# Patient Record
Sex: FEMALE | Race: OTHER | HISPANIC OR LATINO
[De-identification: names, ages, dates, MRNs, and addresses within clinical notes are randomized per-mention and may not be internally consistent; named-entity substitution may affect disease eponyms.]

---

## 2017-02-24 ENCOUNTER — APPOINTMENT (OUTPATIENT)
Dept: ORTHOPEDIC SURGERY | Facility: CLINIC | Age: 42
End: 2017-02-24

## 2018-04-27 ENCOUNTER — LABORATORY RESULT (OUTPATIENT)
Age: 43
End: 2018-04-27

## 2018-04-27 ENCOUNTER — NON-APPOINTMENT (OUTPATIENT)
Age: 43
End: 2018-04-27

## 2018-04-27 ENCOUNTER — APPOINTMENT (OUTPATIENT)
Dept: PEDIATRIC ALLERGY IMMUNOLOGY | Facility: CLINIC | Age: 43
End: 2018-04-27
Payer: MEDICAID

## 2018-04-27 VITALS
OXYGEN SATURATION: 98 % | HEART RATE: 89 BPM | SYSTOLIC BLOOD PRESSURE: 134 MMHG | BODY MASS INDEX: 23.55 KG/M2 | WEIGHT: 128 LBS | HEIGHT: 61.97 IN | DIASTOLIC BLOOD PRESSURE: 96 MMHG

## 2018-04-27 DIAGNOSIS — J45.40 MODERATE PERSISTENT ASTHMA, UNCOMPLICATED: ICD-10-CM

## 2018-04-27 DIAGNOSIS — H10.13 ACUTE ATOPIC CONJUNCTIVITIS, BILATERAL: ICD-10-CM

## 2018-04-27 PROCEDURE — 94060 EVALUATION OF WHEEZING: CPT | Mod: 59

## 2018-04-27 PROCEDURE — 99244 OFF/OP CNSLTJ NEW/EST MOD 40: CPT | Mod: 25

## 2018-04-27 PROCEDURE — 95004 PERQ TESTS W/ALRGNC XTRCS: CPT

## 2018-04-27 RX ORDER — ALBUTEROL SULFATE 90 UG/1
108 (90 BASE) AEROSOL, METERED RESPIRATORY (INHALATION)
Qty: 1 | Refills: 2 | Status: ACTIVE | COMMUNITY
Start: 2018-04-27 | End: 1900-01-01

## 2018-05-15 ENCOUNTER — OTHER (OUTPATIENT)
Age: 43
End: 2018-05-15

## 2018-05-15 ENCOUNTER — MOBILE ON CALL (OUTPATIENT)
Age: 43
End: 2018-05-15

## 2018-05-15 LAB
A ALTERNATA IGE QN: <0.1 KUA/L
A FUMIGATUS IGE QN: <0.1 KUA/L
AMER BEECH IGE QN: 0
BOXELDER IGE QN: <0.1 KUA/L
C HERBARUM IGE QN: <0.1 KUA/L
C LUNATA IGE QN: <0.1 KUA/L
CAT DANDER IGE QN: <0.1 KUA/L
CMN PIGWEED IGE QN: <0.1 KUA/L
COCKLEBUR IGE QN: <0.1 KUA/L
COCKSFOOT IGE QN: <0.1 KUA/L
COMMON RAGWEED IGE QN: <0.1 KUA/L
COTTONWOOD IGE QN: <0.1 KUA/L
D FARINAE IGE QN: <0.1 KUA/L
D PTERONYSS IGE QN: <0.1 KUA/L
DEPRECATED A ALTERNATA IGE RAST QL: 0
DEPRECATED A FUMIGATUS IGE RAST QL: 0
DEPRECATED A PULLULANS IGE RAST QL: 0
DEPRECATED AMER BEECH IGE RAST QL: <0.1 KUA/L
DEPRECATED BOXELDER IGE RAST QL: 0
DEPRECATED C HERBARUM IGE RAST QL: 0
DEPRECATED C LUNATA IGE RAST QL: 0
DEPRECATED CAT DANDER IGE RAST QL: 0
DEPRECATED COCKLEBUR IGE RAST QL: 0
DEPRECATED COCKSFOOT IGE RAST QL: 0
DEPRECATED COMMON PIGWEED IGE RAST QL: 0
DEPRECATED COMMON RAGWEED IGE RAST QL: 0
DEPRECATED COTTONWOOD IGE RAST QL: 0
DEPRECATED D FARINAE IGE RAST QL: 0
DEPRECATED D PTERONYSS IGE RAST QL: 0
DEPRECATED DOG DANDER IGE RAST QL: 0
DEPRECATED ENGL PLANTAIN IGE RAST QL: 0
DEPRECATED F MONILIFORME IGE RAST QL: 0
DEPRECATED GIANT RAGWEED IGE RAST QL: 0
DEPRECATED GOOSE FEATHER IGE RAST QL: 0
DEPRECATED GOOSEFOOT IGE RAST QL: 0
DEPRECATED KENT BLUE GRASS IGE RAST QL: 0
DEPRECATED LONDON PLANE IGE RAST QL: 0
DEPRECATED M RACEMOSUS IGE RAST QL: 0
DEPRECATED MUGWORT IGE RAST QL: 0
DEPRECATED P NOTATUM IGE RAST QL: 0
DEPRECATED RED CEDAR IGE RAST QL: 0
DEPRECATED RED TOP GRASS IGE RAST QL: 0
DEPRECATED ROACH IGE RAST QL: 0
DEPRECATED RYE IGE RAST QL: 0
DEPRECATED SALTWORT IGE RAST QL: 0
DEPRECATED SILVER BIRCH IGE RAST QL: 0
DEPRECATED TIMOTHY IGE RAST QL: 0
DEPRECATED WHITE ASH IGE RAST QL: 0
DEPRECATED WHITE HICKORY IGE RAST QL: 0
DEPRECATED WHITE OAK IGE RAST QL: 0
DOG DANDER IGE QN: <0.1 KUA/L
ENGL PLANTAIN IGE QN: <0.1 KUA/L
F MONILIFORME IGE QN: <0.1 KUA/L
GIANT RAGWEED IGE QN: <0.1 KUA/L
GOOSE FEATHER IGE QN: <0.1 KUA/L
GOOSEFOOT IGE QN: <0.1 KUA/L
GRAY ALDER (T2) CLASS: 0
GRAY ALDER (T2) CONC: <0.1 KUA/L
KENT BLUE GRASS IGE QN: <0.1 KUA/L
LONDON PLANE IGE QN: <0.1 KUA/L
M RACEMOSUS IGE QN: <0.1 KUA/L
MOLD (AUREOBASIDIUM M12) CONC: <0.1 KUA/L
MUGWORT IGE QN: <0.1 KUA/L
MULBERRY (T70) CLASS: 0
MULBERRY (T70) CONC: <0.1 KUA/L
P NOTATUM IGE QN: <0.1 KUA/L
RED CEDAR IGE QN: <0.1 KUA/L
RED TOP GRASS IGE QN: <0.1 KUA/L
ROACH IGE QN: <0.1 KUA/L
RYE IGE QN: <0.1 KUA/L
SALTWORT IGE QN: <0.1 KUA/L
SILVER BIRCH IGE QN: <0.1 KUA/L
TIMOTHY IGE QN: <0.1 KUA/L
WHITE ASH IGE QN: <0.1 KUA/L
WHITE ELM IGE QN: 0
WHITE ELM IGE QN: <0.1 KUA/L
WHITE HICKORY IGE QN: <0.1 KUA/L
WHITE OAK IGE QN: <0.1 KUA/L

## 2018-05-25 ENCOUNTER — NON-APPOINTMENT (OUTPATIENT)
Age: 43
End: 2018-05-25

## 2018-05-25 ENCOUNTER — APPOINTMENT (OUTPATIENT)
Dept: PEDIATRIC ALLERGY IMMUNOLOGY | Facility: CLINIC | Age: 43
End: 2018-05-25
Payer: MEDICAID

## 2018-05-25 VITALS
DIASTOLIC BLOOD PRESSURE: 90 MMHG | BODY MASS INDEX: 24.74 KG/M2 | SYSTOLIC BLOOD PRESSURE: 140 MMHG | WEIGHT: 126 LBS | HEIGHT: 60 IN | OXYGEN SATURATION: 98 % | HEART RATE: 80 BPM

## 2018-05-25 DIAGNOSIS — J45.30 MILD PERSISTENT ASTHMA, UNCOMPLICATED: ICD-10-CM

## 2018-05-25 DIAGNOSIS — J31.0 CHRONIC RHINITIS: ICD-10-CM

## 2018-05-25 PROCEDURE — 94010 BREATHING CAPACITY TEST: CPT

## 2018-05-25 PROCEDURE — 99214 OFFICE O/P EST MOD 30 MIN: CPT | Mod: 25

## 2018-05-25 RX ORDER — AZELASTINE HYDROCHLORIDE 0.5 MG/ML
0.05 SOLUTION/ DROPS OPHTHALMIC TWICE DAILY
Qty: 1 | Refills: 1 | Status: DISCONTINUED | COMMUNITY
Start: 2018-04-27 | End: 2018-05-25

## 2018-05-25 RX ORDER — CHOLECALCIFEROL (VITAMIN D3) 1250 MCG
1.25 MG TABLET ORAL
Qty: 4 | Refills: 0 | Status: ACTIVE | COMMUNITY
Start: 2018-02-14

## 2018-05-25 RX ORDER — AZITHROMYCIN 250 MG/1
250 TABLET, FILM COATED ORAL
Qty: 6 | Refills: 0 | Status: COMPLETED | COMMUNITY
Start: 2018-05-11

## 2018-05-25 RX ORDER — FLUTICASONE PROPIONATE 50 UG/1
50 SPRAY, METERED NASAL DAILY
Qty: 1 | Refills: 2 | Status: DISCONTINUED | COMMUNITY
Start: 2018-04-27 | End: 2018-05-25

## 2018-05-26 ENCOUNTER — EMERGENCY (EMERGENCY)
Facility: HOSPITAL | Age: 43
LOS: 1 days | Discharge: HOME | End: 2018-05-26
Admitting: EMERGENCY MEDICINE
Payer: MEDICAID

## 2018-05-26 VITALS
SYSTOLIC BLOOD PRESSURE: 164 MMHG | HEART RATE: 72 BPM | OXYGEN SATURATION: 100 % | RESPIRATION RATE: 16 BRPM | DIASTOLIC BLOOD PRESSURE: 90 MMHG

## 2018-05-26 VITALS
DIASTOLIC BLOOD PRESSURE: 93 MMHG | RESPIRATION RATE: 16 BRPM | OXYGEN SATURATION: 100 % | TEMPERATURE: 98 F | HEART RATE: 89 BPM | SYSTOLIC BLOOD PRESSURE: 158 MMHG

## 2018-05-26 DIAGNOSIS — I51.89 OTHER ILL-DEFINED HEART DISEASES: Chronic | ICD-10-CM

## 2018-05-26 PROCEDURE — 99284 EMERGENCY DEPT VISIT MOD MDM: CPT

## 2018-05-26 RX ORDER — IBUPROFEN 200 MG
1 TABLET ORAL
Qty: 20 | Refills: 0
Start: 2018-05-26 | End: 2018-05-30

## 2018-05-26 RX ORDER — DIAZEPAM 5 MG
1 TABLET ORAL
Qty: 8 | Refills: 0
Start: 2018-05-26 | End: 2018-05-27

## 2018-05-26 RX ORDER — KETOROLAC TROMETHAMINE 30 MG/ML
15 SYRINGE (ML) INJECTION ONCE
Qty: 0 | Refills: 0 | Status: DISCONTINUED | OUTPATIENT
Start: 2018-05-26 | End: 2018-05-26

## 2018-05-26 RX ORDER — LIDOCAINE 4 G/100G
1 CREAM TOPICAL ONCE
Qty: 0 | Refills: 0 | Status: COMPLETED | OUTPATIENT
Start: 2018-05-26 | End: 2018-05-26

## 2018-05-26 RX ADMIN — LIDOCAINE 1 PATCH: 4 CREAM TOPICAL at 15:31

## 2018-05-26 RX ADMIN — Medication 15 MILLIGRAM(S): at 15:31

## 2018-05-26 NOTE — ED PROVIDER NOTE - MEDICAL DECISION MAKING DETAILS
44 yo F here for lower back pain acute on chronic. no red flags. no midline pain. otherwise well. will give nsaids, lidoderm, valium and dc with short rx. follow up with spine.

## 2018-05-26 NOTE — ED PROVIDER NOTE - PLAN OF CARE
Take medication as prescribed. Do not drive or operate machinery while taking valium. Rest, drink plenty of fluids.  Advance activity as tolerated.  Continue all previously prescribed medications as directed. You can use motrin 600mg every 6-8 hours for pain or fever, and/or Tylenol 650 mg every 4 hours for pain/fever. Follow up with your primary care physician in 48-72 hours- bring copies of your results.  Return to the emergency department for chest pain, shortness of breath, dizziness, or worsening, concerning, or persistent symptoms.

## 2018-05-26 NOTE — ED PROVIDER NOTE - PHYSICAL EXAMINATION
back: no midline tenderness, no palpable step off, no erythema/ecchymosis/swelling, from. mild bilateral lumbar paraspinal msk tenderness.

## 2018-05-26 NOTE — ED PROVIDER NOTE - OBJECTIVE STATEMENT
42 yo F here for acute on chronic back pain x 2 days. pt requesting daughter to translate in Sammarinese at the bedside. pt reports she was lifting laundry and began to feel bilateral lower back pain. no meds taken. denies direct injury or trauma. reports years ago had back surgery and was told she has two discs that "are bad" however they are waiting on a second surgery until she is older. reports this pain happens "a few times per month". not currently on meds/pt. denies urinary/bladder incontinence. denies saddle anesthesia. denies fever chills vomiting diarrhea cp sob weakness numbness tingling

## 2018-05-26 NOTE — ED PROVIDER NOTE - CARE PLAN
Principal Discharge DX:	Back pain  Assessment and plan of treatment:	Take medication as prescribed. Do not drive or operate machinery while taking valium. Rest, drink plenty of fluids.  Advance activity as tolerated.  Continue all previously prescribed medications as directed. You can use motrin 600mg every 6-8 hours for pain or fever, and/or Tylenol 650 mg every 4 hours for pain/fever. Follow up with your primary care physician in 48-72 hours- bring copies of your results.  Return to the emergency department for chest pain, shortness of breath, dizziness, or worsening, concerning, or persistent symptoms.

## 2018-05-26 NOTE — ED PROVIDER NOTE - PMH
Anemia    Asthma    Depression    GERD (gastroesophageal reflux disease)    HTN (hypertension)    Hyperlipidemia  has not been prescribed medication as of yet  Liver disorder  "the Doctor at the clinic told me my liver's inflamed"  Spinal stenosis of lumbar region

## 2018-05-26 NOTE — ED ADULT TRIAGE NOTE - CHIEF COMPLAINT QUOTE
pt brought to triage c/o back pain while "fixing clothes" unable to ambulate 2/2 pain, denies numbness or tingling in extremities, no neuro deficits noted

## 2018-05-30 ENCOUNTER — APPOINTMENT (OUTPATIENT)
Dept: ORTHOPEDIC SURGERY | Facility: CLINIC | Age: 43
End: 2018-05-30
Payer: MEDICAID

## 2018-05-30 DIAGNOSIS — S39.012A STRAIN OF MUSCLE, FASCIA AND TENDON OF LOWER BACK, INITIAL ENCOUNTER: ICD-10-CM

## 2018-05-30 PROCEDURE — 99214 OFFICE O/P EST MOD 30 MIN: CPT

## 2018-05-30 RX ORDER — PREDNISONE 20 MG/1
20 TABLET ORAL DAILY
Qty: 18 | Refills: 0 | Status: ACTIVE | COMMUNITY
Start: 2018-05-30 | End: 1900-01-01

## 2018-06-08 ENCOUNTER — APPOINTMENT (OUTPATIENT)
Dept: ORTHOPEDIC SURGERY | Facility: CLINIC | Age: 43
End: 2018-06-08

## 2018-07-17 ENCOUNTER — OTHER (OUTPATIENT)
Age: 43
End: 2018-07-17

## 2018-07-17 RX ORDER — FEXOFENADINE HYDROCHLORIDE 180 MG/1
180 TABLET ORAL DAILY
Qty: 1 | Refills: 2 | Status: ACTIVE | COMMUNITY
Start: 2018-04-27 | End: 1900-01-01

## 2018-08-31 ENCOUNTER — APPOINTMENT (OUTPATIENT)
Dept: PEDIATRIC ALLERGY IMMUNOLOGY | Facility: CLINIC | Age: 43
End: 2018-08-31

## 2018-09-10 ENCOUNTER — EMERGENCY (EMERGENCY)
Facility: HOSPITAL | Age: 43
LOS: 1 days | Discharge: HOME | End: 2018-09-10
Payer: COMMERCIAL

## 2018-09-10 VITALS
HEIGHT: 57 IN | WEIGHT: 134.92 LBS | RESPIRATION RATE: 18 BRPM | HEART RATE: 96 BPM | DIASTOLIC BLOOD PRESSURE: 86 MMHG | SYSTOLIC BLOOD PRESSURE: 128 MMHG | TEMPERATURE: 98 F | OXYGEN SATURATION: 99 %

## 2018-09-10 VITALS
TEMPERATURE: 98 F | RESPIRATION RATE: 18 BRPM | OXYGEN SATURATION: 100 % | SYSTOLIC BLOOD PRESSURE: 140 MMHG | HEART RATE: 80 BPM | DIASTOLIC BLOOD PRESSURE: 92 MMHG

## 2018-09-10 DIAGNOSIS — R10.2 PELVIC AND PERINEAL PAIN: ICD-10-CM

## 2018-09-10 DIAGNOSIS — I51.89 OTHER ILL-DEFINED HEART DISEASES: Chronic | ICD-10-CM

## 2018-09-10 LAB
ALBUMIN SERPL ELPH-MCNC: 4.4 G/DL — SIGNIFICANT CHANGE UP (ref 3.3–5)
ALP SERPL-CCNC: 154 U/L — HIGH (ref 40–120)
ALT FLD-CCNC: 59 U/L — HIGH (ref 10–45)
ANION GAP SERPL CALC-SCNC: 11 MMOL/L — SIGNIFICANT CHANGE UP (ref 5–17)
APPEARANCE UR: CLEAR — SIGNIFICANT CHANGE UP
APTT BLD: 27.2 SEC — LOW (ref 27.5–37.4)
AST SERPL-CCNC: 35 U/L — SIGNIFICANT CHANGE UP (ref 10–40)
BACTERIA # UR AUTO: NEGATIVE — SIGNIFICANT CHANGE UP
BASOPHILS # BLD AUTO: 0 K/UL — SIGNIFICANT CHANGE UP (ref 0–0.2)
BASOPHILS NFR BLD AUTO: 0.2 % — SIGNIFICANT CHANGE UP (ref 0–2)
BILIRUB SERPL-MCNC: 0.4 MG/DL — SIGNIFICANT CHANGE UP (ref 0.2–1.2)
BILIRUB UR-MCNC: NEGATIVE — SIGNIFICANT CHANGE UP
BLD GP AB SCN SERPL QL: NEGATIVE — SIGNIFICANT CHANGE UP
BUN SERPL-MCNC: 14 MG/DL — SIGNIFICANT CHANGE UP (ref 7–23)
CALCIUM SERPL-MCNC: 9.1 MG/DL — SIGNIFICANT CHANGE UP (ref 8.4–10.5)
CHLORIDE SERPL-SCNC: 97 MMOL/L — SIGNIFICANT CHANGE UP (ref 96–108)
CO2 SERPL-SCNC: 23 MMOL/L — SIGNIFICANT CHANGE UP (ref 22–31)
COLOR SPEC: SIGNIFICANT CHANGE UP
CREAT SERPL-MCNC: 0.75 MG/DL — SIGNIFICANT CHANGE UP (ref 0.5–1.3)
DIFF PNL FLD: NEGATIVE — SIGNIFICANT CHANGE UP
EOSINOPHIL # BLD AUTO: 0.1 K/UL — SIGNIFICANT CHANGE UP (ref 0–0.5)
EOSINOPHIL NFR BLD AUTO: 1 % — SIGNIFICANT CHANGE UP (ref 0–6)
EPI CELLS # UR: 6 /HPF — HIGH
GLUCOSE SERPL-MCNC: 91 MG/DL — SIGNIFICANT CHANGE UP (ref 70–99)
GLUCOSE UR QL: ABNORMAL
HCG SERPL-ACNC: <2 MIU/ML — SIGNIFICANT CHANGE UP
HCT VFR BLD CALC: 34.7 % — SIGNIFICANT CHANGE UP (ref 34.5–45)
HCT VFR BLD CALC: 38.2 % — SIGNIFICANT CHANGE UP (ref 34.5–45)
HGB BLD-MCNC: 11.9 G/DL — SIGNIFICANT CHANGE UP (ref 11.5–15.5)
HGB BLD-MCNC: 13.1 G/DL — SIGNIFICANT CHANGE UP (ref 11.5–15.5)
HYALINE CASTS # UR AUTO: 2 /LPF — SIGNIFICANT CHANGE UP (ref 0–2)
INR BLD: 1.09 RATIO — SIGNIFICANT CHANGE UP (ref 0.88–1.16)
KETONES UR-MCNC: NEGATIVE — SIGNIFICANT CHANGE UP
LEUKOCYTE ESTERASE UR-ACNC: NEGATIVE — SIGNIFICANT CHANGE UP
LYMPHOCYTES # BLD AUTO: 1.4 K/UL — SIGNIFICANT CHANGE UP (ref 1–3.3)
LYMPHOCYTES # BLD AUTO: 13.3 % — SIGNIFICANT CHANGE UP (ref 13–44)
MCHC RBC-ENTMCNC: 28.9 PG — SIGNIFICANT CHANGE UP (ref 27–34)
MCHC RBC-ENTMCNC: 29 PG — SIGNIFICANT CHANGE UP (ref 27–34)
MCHC RBC-ENTMCNC: 34.3 GM/DL — SIGNIFICANT CHANGE UP (ref 32–36)
MCHC RBC-ENTMCNC: 34.3 GM/DL — SIGNIFICANT CHANGE UP (ref 32–36)
MCV RBC AUTO: 84.4 FL — SIGNIFICANT CHANGE UP (ref 80–100)
MCV RBC AUTO: 84.5 FL — SIGNIFICANT CHANGE UP (ref 80–100)
MONOCYTES # BLD AUTO: 0.7 K/UL — SIGNIFICANT CHANGE UP (ref 0–0.9)
MONOCYTES NFR BLD AUTO: 7 % — SIGNIFICANT CHANGE UP (ref 2–14)
NEUTROPHILS # BLD AUTO: 8.1 K/UL — HIGH (ref 1.8–7.4)
NEUTROPHILS NFR BLD AUTO: 78.5 % — HIGH (ref 43–77)
NITRITE UR-MCNC: NEGATIVE — SIGNIFICANT CHANGE UP
PH UR: 7 — SIGNIFICANT CHANGE UP (ref 5–8)
PLATELET # BLD AUTO: 228 K/UL — SIGNIFICANT CHANGE UP (ref 150–400)
PLATELET # BLD AUTO: 254 K/UL — SIGNIFICANT CHANGE UP (ref 150–400)
POTASSIUM SERPL-MCNC: 3.2 MMOL/L — LOW (ref 3.5–5.3)
POTASSIUM SERPL-SCNC: 3.2 MMOL/L — LOW (ref 3.5–5.3)
PROT SERPL-MCNC: 7.5 G/DL — SIGNIFICANT CHANGE UP (ref 6–8.3)
PROT UR-MCNC: ABNORMAL
PROTHROM AB SERPL-ACNC: 11.9 SEC — SIGNIFICANT CHANGE UP (ref 9.8–12.7)
RBC # BLD: 4.1 M/UL — SIGNIFICANT CHANGE UP (ref 3.8–5.2)
RBC # BLD: 4.53 M/UL — SIGNIFICANT CHANGE UP (ref 3.8–5.2)
RBC # FLD: 12.9 % — SIGNIFICANT CHANGE UP (ref 10.3–14.5)
RBC # FLD: 13.2 % — SIGNIFICANT CHANGE UP (ref 10.3–14.5)
RBC CASTS # UR COMP ASSIST: 4 /HPF — SIGNIFICANT CHANGE UP (ref 0–4)
RH IG SCN BLD-IMP: POSITIVE — SIGNIFICANT CHANGE UP
RH IG SCN BLD-IMP: POSITIVE — SIGNIFICANT CHANGE UP
SODIUM SERPL-SCNC: 131 MMOL/L — LOW (ref 135–145)
SP GR SPEC: 1.02 — SIGNIFICANT CHANGE UP (ref 1.01–1.02)
UROBILINOGEN FLD QL: NEGATIVE — SIGNIFICANT CHANGE UP
WBC # BLD: 10.3 K/UL — SIGNIFICANT CHANGE UP (ref 3.8–10.5)
WBC # BLD: 7.7 K/UL — SIGNIFICANT CHANGE UP (ref 3.8–10.5)
WBC # FLD AUTO: 10.3 K/UL — SIGNIFICANT CHANGE UP (ref 3.8–10.5)
WBC # FLD AUTO: 7.7 K/UL — SIGNIFICANT CHANGE UP (ref 3.8–10.5)
WBC UR QL: 2 /HPF — SIGNIFICANT CHANGE UP (ref 0–5)

## 2018-09-10 PROCEDURE — 85730 THROMBOPLASTIN TIME PARTIAL: CPT

## 2018-09-10 PROCEDURE — 86901 BLOOD TYPING SEROLOGIC RH(D): CPT

## 2018-09-10 PROCEDURE — 74177 CT ABD & PELVIS W/CONTRAST: CPT

## 2018-09-10 PROCEDURE — 85610 PROTHROMBIN TIME: CPT

## 2018-09-10 PROCEDURE — 99243 OFF/OP CNSLTJ NEW/EST LOW 30: CPT

## 2018-09-10 PROCEDURE — 74177 CT ABD & PELVIS W/CONTRAST: CPT | Mod: 26

## 2018-09-10 PROCEDURE — 85027 COMPLETE CBC AUTOMATED: CPT

## 2018-09-10 PROCEDURE — 81001 URINALYSIS AUTO W/SCOPE: CPT

## 2018-09-10 PROCEDURE — 76817 TRANSVAGINAL US OBSTETRIC: CPT

## 2018-09-10 PROCEDURE — 80053 COMPREHEN METABOLIC PANEL: CPT

## 2018-09-10 PROCEDURE — 86850 RBC ANTIBODY SCREEN: CPT

## 2018-09-10 PROCEDURE — 96374 THER/PROPH/DIAG INJ IV PUSH: CPT | Mod: XU

## 2018-09-10 PROCEDURE — 93975 VASCULAR STUDY: CPT

## 2018-09-10 PROCEDURE — 76817 TRANSVAGINAL US OBSTETRIC: CPT | Mod: 26

## 2018-09-10 PROCEDURE — 84702 CHORIONIC GONADOTROPIN TEST: CPT

## 2018-09-10 PROCEDURE — 93975 VASCULAR STUDY: CPT | Mod: 26

## 2018-09-10 PROCEDURE — 86900 BLOOD TYPING SEROLOGIC ABO: CPT

## 2018-09-10 PROCEDURE — 99284 EMERGENCY DEPT VISIT MOD MDM: CPT

## 2018-09-10 PROCEDURE — 99284 EMERGENCY DEPT VISIT MOD MDM: CPT | Mod: 25

## 2018-09-10 PROCEDURE — 99283 EMERGENCY DEPT VISIT LOW MDM: CPT

## 2018-09-10 RX ORDER — OXYCODONE HYDROCHLORIDE 5 MG/1
5 TABLET ORAL ONCE
Qty: 0 | Refills: 0 | Status: DISCONTINUED | OUTPATIENT
Start: 2018-09-10 | End: 2018-09-10

## 2018-09-10 RX ORDER — OXYCODONE HYDROCHLORIDE 5 MG/1
1 TABLET ORAL
Qty: 3 | Refills: 0
Start: 2018-09-10 | End: 2018-09-10

## 2018-09-10 RX ORDER — ACETAMINOPHEN 500 MG
1000 TABLET ORAL ONCE
Qty: 0 | Refills: 0 | Status: COMPLETED | OUTPATIENT
Start: 2018-09-10 | End: 2018-09-10

## 2018-09-10 RX ADMIN — Medication 400 MILLIGRAM(S): at 15:28

## 2018-09-10 RX ADMIN — OXYCODONE HYDROCHLORIDE 5 MILLIGRAM(S): 5 TABLET ORAL at 15:28

## 2018-09-10 NOTE — ED PROVIDER NOTE - NSCAREINITIATED _GEN_ER
Samir Hugo(Attending) NO chest pain, SOB,  dizziness, syncope, increased lowr extremity edema, fever chills, fatigue abdominal pain, N/V/C/D BRBPR, melena, urinary symptoms.

## 2018-09-10 NOTE — ED ADULT NURSE REASSESSMENT NOTE - NS ED NURSE REASSESS COMMENT FT1
Pt remains a&oX4, resting comfortably in bed in no apparent distress. Family at bedside. VSS. Pt reporting some pain relief at this time. safety maintained

## 2018-09-10 NOTE — ED PROVIDER NOTE - OBJECTIVE STATEMENT
43F  h/o HTN, HLD presents with acute onset LLQ pain since yesterday at 4pm, associated with N/V, no diarrhea. No fevers, chills. LMP was . No vaginal discharge. Feels like her abdomen is bloated.  Onset: 4pm

## 2018-09-10 NOTE — ED ADULT NURSE NOTE - NSIMPLEMENTINTERV_GEN_ALL_ED
Implemented All Universal Safety Interventions:  [place] to call system. Call bell, personal items and telephone within reach. Instruct patient to call for assistance. Room bathroom lighting operational. Non-slip footwear when patient is off stretcher. Physically safe environment: no spills, clutter or unnecessary equipment. Stretcher in lowest position, wheels locked, appropriate side rails in place.

## 2018-09-10 NOTE — CONSULT NOTE ADULT - SUBJECTIVE AND OBJECTIVE BOX
GYN Consult Note  : , declined pacific     43y  LMP  presents with sudden onset left sided abdominal pain that began yesterday while riding in the car, worsening progressively throughout the day. Pt states that pain is 7/10 in severity, non radiating, constant in nature. Notes some nausea yesterday, but denies emesis. Denies CP, SOB, fevers, chills, changes in GI/ fxn, vaginal discharge, itchiness, bleeding.        OB/GYN HISTORY:   NSVDx7  pap 2018 wnl as per pt  on OCPs, irregular periods     GYN= Dr. Crockett (sp) from [address] [place] [address] in [place]     PAST MEDICAL & SURGICAL HISTORY:  Hyperlipidemia: has not been prescribed medication as of yet  Liver disorder: &quot;the Doctor at the clinic told me my liver&#x27;s inflamed&quot;  HTN (hypertension)  Spinal stenosis of lumbar region  Asthma  Anemia  GERD (gastroesophageal reflux disease)  Depression  Familial heart disease      REVIEW OF SYSTEMS    General: denies fevers, chills, tiredness    Skin/Breast: denies breast pain  	  Respiratory and Thorax: denies shortness of breath, denies cough  	  Cardiovascular: denies chest pain	 and denies palpitations    Gastrointestinal: +abdominal pain    Genitourinary: denies dysuria, increased urinary frequency, urgency	    Constitutional, Cardiovascular, Respiratory, Gastrointestinal, Genitourinary, Musculoskeletal and Integumentary review of systems are normal except as noted. 	    MEDICATIONS  (STANDING): antihypertensives (unknown name)      Allergies    No Known Allergies    Intolerances        SOCIAL HISTORY: denies x3    FAMILY HISTORY:  Family history of heart disease      Vital Signs Last 24 Hrs  T(C): 36.8 (10 Sep 2018 13:14), Max: 36.8 (10 Sep 2018 13:14)  T(F): 98.3 (10 Sep 2018 13:14), Max: 98.3 (10 Sep 2018 13:14)  HR: 89 (10 Sep 2018 13:14) (84 - 96)  BP: 131/84 (10 Sep 2018 13:14) (128/86 - 146/94)  BP(mean): --  RR: 18 (10 Sep 2018 13:14) (18 - 18)  SpO2: 100% (10 Sep 2018 13:14) (99% - 100%)    PHYSICAL EXAM:      Gen: NAD, alert and oriented x 3    Abd: soft, +mild distension, no rebound/guarding, +LLQ tenderness    Pelvic:  closed/ long, no CMT, Uterus: normal size, non tender    Adnexa: mild left sided tenderness  Extremities: NTBL    Skin: warm and well perfused    LABS:             bHCG <2             11.9   7.7   )-----------( 228      ( 10 Sep 2018 17:07 )             34.7     09-10    131<L>  |  97  |  14  ----------------------------<  91  3.2<L>   |  23  |  0.75    Ca    9.1      10 Sep 2018 09:31    TPro  7.5  /  Alb  4.4  /  TBili  0.4  /  DBili  x   /  AST  35  /  ALT  59<H>  /  AlkPhos  154<H>  09-10    PT/INR - ( 10 Sep 2018 09:31 )   PT: 11.9 sec;   INR: 1.09 ratio         PTT - ( 10 Sep 2018 09:31 )  PTT:27.2 sec  Urinalysis Basic - ( 10 Sep 2018 09:31 )    Color: Light Yellow / Appearance: Clear / S.022 / pH: x  Gluc: x / Ketone: Negative  / Bili: Negative / Urobili: Negative   Blood: x / Protein: 30 mg/dL / Nitrite: Negative   Leuk Esterase: Negative / RBC: 4 /hpf / WBC 2 /hpf   Sq Epi: x / Non Sq Epi: 6 /hpf / Bacteria: Negative        RADIOLOGY & ADDITIONAL STUDIES:    < from: US Doppler Pelvis (09.10.18 @ 11:55) >    < from: CT Abdomen and Pelvis w/ IV Cont (09.10.18 @ 12:58) >    EXAM:  CT ABDOMEN AND PELVIS IC                            PROCEDURE DATE:  09/10/2018            INTERPRETATION:  CLINICAL INFORMATION: Left lower quadrant pain.    COMPARISON: None.    PROCEDURE:   CT of the Abdomen and Pelvis was performed with intravenous contrast.   Intravenous contrast: 90 mL Omnipaque 350. 10 mL discarded.  Oral contrast: positive contrast was administered.  Sagittal and coronal reformats were performed.    FINDINGS:    LOWER CHEST: Within normal limits.    LIVER: Withinnormal limits.  BILE DUCTS: Normal caliber.   GALLBLADDER: Within normal limits.  SPLEEN: Within normal limits.  PANCREAS: Within normal limits.  ADRENALS: Within normal limits.  KIDNEYS/URETERS: Within normal limits.     BLADDER: Within normal limits.  REPRODUCTIVE ORGANS: Left ovary appears enlarged with complex lesion in   the left adnexa. Heterogeneous appearance of the uterus, possibly   adenomyosis or uterine leiomyoma.    BOWEL: Periampullary duodenal diverticulum. No bowel obstruction.   Appendix not visualized, but no secondary signs of appendicitis.    PERITONEUM: Moderate volume hemoperitoneum in the abdomen and pelvis.  VESSELS:  Within normal limits.  RETROPERITONEUM: No lymphadenopathy.    ABDOMINAL WALL: Within normal limits.  BONES: Within normal limits.    IMPRESSION: Moderate volume hemoperitoneum and enlarged left ovary,   consistent with ruptured hemorrhagic ovarian cyst, better seen on pelvic   ultrasound of same date.                    WILLIAM ZARAGOZA M.D., ATTENDING RADIOLOGIST  This document has been electronically signed. Sep 10 2018  1:05PM                < end of copied text >    EXAM:  US OB TRANSVAGINAL                          EXAM:  US DPLX PELVIC                            PROCEDURE DATE:  09/10/2018            INTERPRETATION:  CLINICAL INFORMATION: Left lower quadrant pain. Evaluate   for torsion versus ectopic pregnancy. Beta hCG negative.    LMP: 2018    COMPARISON: None available.    TECHNIQUE:     Endovaginal pelvic sonogram only. Color and Spectral Doppler was   performed.        FINDINGS:    Uterus: 12.3 x 5.8 x 6.2 cm. Within normal limits.    Endometrium: 8 mm. Within normal limits.    Right ovary: 2.9 x 1.9 x 1.9 cm. Within normal limits.    Left ovary: 4.0 x 4.2 x 3.9 cm. Complex cyst, possibly hemorrhagic,   measuring 2.7 x 2.8 x 2.8 cm.    Fluid: Moderate to large amount of hemoperitoneum.    IMPRESSION:    Moderate to large hemoperitoneum likely from ruptured left ovarian   hemorrhagic cyst.    Given left ovarian enlargement; torsion should be excluded on clinical   grounds.                JAYDEN HARP M.D., RADIOLOGY FELLOW  This document has been electronically signed.  OLIVE RENTERIA M.D., ATTENDING RADIOLOGIST  This document has been electronically signed. Sep 10 2018 10:52AM                < end of copied text >

## 2018-09-10 NOTE — ED ADULT NURSE REASSESSMENT NOTE - NS ED NURSE REASSESS COMMENT FT1
Pt remains a&oX4, resting comfortably in bed in no apparent distress. Pt medicated for 7/10 abdominal pain. Family at bedside. safety maintained

## 2018-09-10 NOTE — CONSULT NOTE ADULT - ATTENDING COMMENTS
Pt seen in ER.  Agree w above.  VSS;  Nonacute abd exam;  no clinical evidence of torsion  Hct stable  D/C home;  Pt will f/u w her GYN for repeat sono

## 2018-09-10 NOTE — ED PROVIDER NOTE - PROGRESS NOTE DETAILS
Attending MD Au: Gyn consulted, spoke with Gyn, resident saw patient, requesting repeat CBC and coming down to eval with attending. Attending MD Au: Spoke with Gyn MD Mcneal, CBC reviewed, stable, stable for discharge. Recommend close follow up with Gyn (patient has own but can follow up with clinic if unable to see Gyn), will discharge with anemia precautions. Attending MD Au: Patient re-evaluated and feeling improved.  No acute issues at  this time.  Lab and radiology tests reviewed with patient and family in Uzbek.  Patient stable for discharge.  Follow up instructions given to follow up with her own Gyn (if unable [facility]), anemia precautions reviewed, importance of follow up emphasized, return to ED parameters reviewed and patient verbalized understanding.  All questions answered, all concerns addressed.

## 2018-09-10 NOTE — ED ADULT NURSE NOTE - CHPI ED NUR SYMPTOMS NEG
no diarrhea/no burning urination/no dysuria/no blood in stool/no chills/no vomiting/no fever/no hematuria

## 2018-09-10 NOTE — ED PROVIDER NOTE - PHYSICAL EXAMINATION
Leisa Zelaya, .:   GENERAL: Patient awake alert NAD.  HEENT: Moist mucous membranes, PERRL, EOMI  LUNGS: CTAB, no wheezes or crackles.   CARDIAC: RRR, no m/r/g.    ABDOMEN: Soft, NT, ND, No rebound, guarding. No CVA tenderness.   EXT: No edema. No calf tenderness. CV 2+DP/PT bilaterally.   NEURO: A&Ox3. Gait normal.    SKIN: Warm and dry. No rash.  GYN: External exam wnl. No adnexal tenderness, no masses palpated.

## 2018-09-10 NOTE — ED ADULT NURSE REASSESSMENT NOTE - NS ED NURSE REASSESS COMMENT FT1
Tech from CT called to state that patient never received oral contrast earlier (prior to receiving report from Velvet Castorena); MD Hugo aware and called CT to tell them to do scan without oral contrast.

## 2018-09-10 NOTE — ED PROVIDER NOTE - NS ED ROS FT
CONST: no fevers, no chills  EYES: no pain, no vision changes  ENT: no sore throat, no ear pain, no change in hearing  CV: no chest pain, no leg swelling  RESP: no shortness of breath, no cough  ABD: +abdominal pain, +nausea, +vomiting, no diarrhea  : no dysuria, no flank pain, no hematuria  MSK: no back pain, no extremity pain  NEURO: no headache or additional neurologic complaints  HEME: no easy bleeding  SKIN:  no rash

## 2018-09-10 NOTE — ED ADULT NURSE NOTE - OBJECTIVE STATEMENT
Pt is a 42 yo F German speaking, who came to the ED amb c/o abd pain, abd distention, n/v since 4pm yesterday.  A/O x3, abd soft, distended, tender, normal bowel/urinary habits.

## 2018-09-10 NOTE — ED PROVIDER NOTE - ATTENDING CONTRIBUTION TO CARE
MD Hugo:  patient seen and evaluated with the resident.  I was present for key portions of the History & Physical, and I agree with the Impression & Plan.  MD Hugo:  43F, c/o sudden-onset LLQ pain

## 2018-09-10 NOTE — ED PROVIDER NOTE - MEDICAL DECISION MAKING DETAILS
43F p/w LLQ pain. Concern for ectopic vs. torsion vs. diverticulitis. WIll get TVUS, labs, hcg. IF US neg will get CT abd pel. Dispo pending imaging.

## 2018-09-10 NOTE — CONSULT NOTE ADULT - PROBLEM SELECTOR RECOMMENDATION 9
-likely ruptured hemorrhagic cyst given amount of hemoperitoneum. Hgb trended, stable (13.1->11.9). vital signs stable  -likely not ovarian torsion given benign abdomen  -recommend analgesia prn with tylenol and motrin  -recommend close follow up with GYN, Dr. Crockett  -Can give clinic number for follow up [phone]   -recommend return to ED if experiencing signs of anemia, lightheadedness, SOB, CP, nausea/emesis, worsening abdominal pain    Silke, PGY-3  d/w and seen w/ Dr. Salomon

## 2018-09-10 NOTE — ED ADULT NURSE NOTE - PSH
Familial heart disease Scribe Attestation (For Scribes USE Only)... Scribe Attestation (For Scribes USE Only).../Attending Attestation (For Attendings USE Only)...

## 2018-09-12 ENCOUNTER — APPOINTMENT (OUTPATIENT)
Dept: OBGYN | Facility: CLINIC | Age: 43
End: 2018-09-12
Payer: MEDICAID

## 2018-09-12 ENCOUNTER — LABORATORY RESULT (OUTPATIENT)
Age: 43
End: 2018-09-12

## 2018-09-12 ENCOUNTER — OUTPATIENT (OUTPATIENT)
Dept: OUTPATIENT SERVICES | Facility: HOSPITAL | Age: 43
LOS: 1 days | End: 2018-09-12
Payer: COMMERCIAL

## 2018-09-12 VITALS — BODY MASS INDEX: 26.37 KG/M2 | WEIGHT: 135 LBS

## 2018-09-12 DIAGNOSIS — N76.0 ACUTE VAGINITIS: ICD-10-CM

## 2018-09-12 DIAGNOSIS — R10.2 PELVIC AND PERINEAL PAIN: ICD-10-CM

## 2018-09-12 DIAGNOSIS — I51.89 OTHER ILL-DEFINED HEART DISEASES: Chronic | ICD-10-CM

## 2018-09-12 DIAGNOSIS — Z00.00 ENCOUNTER FOR GENERAL ADULT MEDICAL EXAMINATION W/OUT ABNORMAL FINDINGS: ICD-10-CM

## 2018-09-12 PROCEDURE — G0463: CPT

## 2018-09-12 PROCEDURE — 87591 N.GONORRHOEAE DNA AMP PROB: CPT

## 2018-09-12 PROCEDURE — 87491 CHLMYD TRACH DNA AMP PROBE: CPT

## 2018-09-12 PROCEDURE — 99214 OFFICE O/P EST MOD 30 MIN: CPT | Mod: NC

## 2018-09-12 PROCEDURE — 87624 HPV HI-RISK TYP POOLED RSLT: CPT

## 2018-09-13 LAB
C TRACH RRNA SPEC QL NAA+PROBE: SIGNIFICANT CHANGE UP
HPV HIGH+LOW RISK DNA PNL CVX: SIGNIFICANT CHANGE UP
N GONORRHOEA RRNA SPEC QL NAA+PROBE: SIGNIFICANT CHANGE UP
SPECIMEN SOURCE: SIGNIFICANT CHANGE UP

## 2018-09-15 LAB — CYTOLOGY SPEC DOC CYTO: SIGNIFICANT CHANGE UP

## 2018-09-19 ENCOUNTER — MESSAGE (OUTPATIENT)
Age: 43
End: 2018-09-19

## 2018-09-19 DIAGNOSIS — R10.2 PELVIC AND PERINEAL PAIN: ICD-10-CM

## 2018-09-19 DIAGNOSIS — N83.209 UNSPECIFIED OVARIAN CYST, UNSPECIFIED SIDE: ICD-10-CM

## 2018-09-20 ENCOUNTER — FORM ENCOUNTER (OUTPATIENT)
Age: 43
End: 2018-09-20

## 2018-09-21 ENCOUNTER — OUTPATIENT (OUTPATIENT)
Dept: OUTPATIENT SERVICES | Facility: HOSPITAL | Age: 43
LOS: 1 days | End: 2018-09-21
Payer: COMMERCIAL

## 2018-09-21 ENCOUNTER — APPOINTMENT (OUTPATIENT)
Dept: ULTRASOUND IMAGING | Facility: CLINIC | Age: 43
End: 2018-09-21
Payer: MEDICAID

## 2018-09-21 DIAGNOSIS — Z00.8 ENCOUNTER FOR OTHER GENERAL EXAMINATION: ICD-10-CM

## 2018-09-21 DIAGNOSIS — I51.89 OTHER ILL-DEFINED HEART DISEASES: Chronic | ICD-10-CM

## 2018-09-21 PROCEDURE — 76856 US EXAM PELVIC COMPLETE: CPT

## 2018-09-21 PROCEDURE — 76830 TRANSVAGINAL US NON-OB: CPT | Mod: 26

## 2018-09-21 PROCEDURE — 76856 US EXAM PELVIC COMPLETE: CPT | Mod: 26

## 2018-09-21 PROCEDURE — 76830 TRANSVAGINAL US NON-OB: CPT

## 2018-10-04 ENCOUNTER — OUTPATIENT (OUTPATIENT)
Dept: OUTPATIENT SERVICES | Facility: HOSPITAL | Age: 43
LOS: 1 days | End: 2018-10-04
Payer: COMMERCIAL

## 2018-10-04 ENCOUNTER — APPOINTMENT (OUTPATIENT)
Dept: OBGYN | Facility: CLINIC | Age: 43
End: 2018-10-04
Payer: MEDICAID

## 2018-10-04 VITALS — DIASTOLIC BLOOD PRESSURE: 82 MMHG | WEIGHT: 134 LBS | SYSTOLIC BLOOD PRESSURE: 120 MMHG | BODY MASS INDEX: 26.17 KG/M2

## 2018-10-04 DIAGNOSIS — N76.0 ACUTE VAGINITIS: ICD-10-CM

## 2018-10-04 DIAGNOSIS — I51.89 OTHER ILL-DEFINED HEART DISEASES: Chronic | ICD-10-CM

## 2018-10-04 DIAGNOSIS — N83.209 UNSPECIFIED OVARIAN CYST, UNSPECIFIED SIDE: ICD-10-CM

## 2018-10-04 PROCEDURE — G0463: CPT

## 2018-10-04 PROCEDURE — 99213 OFFICE O/P EST LOW 20 MIN: CPT | Mod: NC

## 2018-10-16 DIAGNOSIS — N83.209 UNSPECIFIED OVARIAN CYST, UNSPECIFIED SIDE: ICD-10-CM

## 2018-11-19 ENCOUNTER — RX RENEWAL (OUTPATIENT)
Age: 43
End: 2018-11-19

## 2018-12-28 ENCOUNTER — OTHER (OUTPATIENT)
Age: 43
End: 2018-12-28

## 2018-12-28 RX ORDER — FLUTICASONE PROPIONATE 110 UG/1
110 AEROSOL, METERED RESPIRATORY (INHALATION)
Qty: 12 | Refills: 0 | Status: ACTIVE | COMMUNITY
Start: 2018-04-27 | End: 1900-01-01

## 2019-12-25 PROBLEM — R10.2 PELVIC PAIN IN FEMALE: Status: ACTIVE | Noted: 2018-09-12

## 2022-03-17 ENCOUNTER — LABORATORY RESULT (OUTPATIENT)
Age: 47
End: 2022-03-17

## 2022-03-17 ENCOUNTER — APPOINTMENT (OUTPATIENT)
Dept: DERMATOLOGY | Facility: CLINIC | Age: 47
End: 2022-03-17
Payer: MEDICAID

## 2022-03-17 VITALS — HEIGHT: 57 IN | BODY MASS INDEX: 28.05 KG/M2 | WEIGHT: 130 LBS

## 2022-03-17 DIAGNOSIS — R21 RASH AND OTHER NONSPECIFIC SKIN ERUPTION: ICD-10-CM

## 2022-03-17 DIAGNOSIS — L73.9 FOLLICULAR DISORDER, UNSPECIFIED: ICD-10-CM

## 2022-03-17 PROCEDURE — 99204 OFFICE O/P NEW MOD 45 MIN: CPT | Mod: 25

## 2022-03-17 PROCEDURE — 99072 ADDL SUPL MATRL&STAF TM PHE: CPT

## 2022-03-17 PROCEDURE — 11102 TANGNTL BX SKIN SINGLE LES: CPT

## 2022-03-17 RX ORDER — BENZOYL PEROXIDE 100 MG/ML
10 LIQUID TOPICAL DAILY
Qty: 1 | Refills: 3 | Status: ACTIVE | COMMUNITY
Start: 2022-03-17 | End: 1900-01-01

## 2022-03-17 RX ORDER — TACROLIMUS 1 MG/G
0.1 OINTMENT TOPICAL
Qty: 60 | Refills: 0 | Status: ACTIVE | COMMUNITY
Start: 2022-03-17 | End: 1900-01-01

## 2022-03-18 ENCOUNTER — NON-APPOINTMENT (OUTPATIENT)
Age: 47
End: 2022-03-18

## 2022-03-24 ENCOUNTER — NON-APPOINTMENT (OUTPATIENT)
Age: 47
End: 2022-03-24

## 2022-03-24 LAB
ALBUMIN MFR SERPL ELPH: 60.7 %
ALBUMIN SERPL-MCNC: 4.4 G/DL
ALBUMIN/GLOB SERPL: 1.6 RATIO
ALBUPE: 41.7 %
ALPHA1 GLOB MFR SERPL ELPH: 3.8 %
ALPHA1 GLOB SERPL ELPH-MCNC: 0.3 G/DL
ALPHA1UPE: 16.8 %
ALPHA2 GLOB MFR SERPL ELPH: 9.2 %
ALPHA2 GLOB SERPL ELPH-MCNC: 0.7 G/DL
ALPHA2UPE: 12.8 %
ANA SER IF-ACNC: NEGATIVE
B-GLOBULIN MFR SERPL ELPH: 11.2 %
B-GLOBULIN SERPL ELPH-MCNC: 0.8 G/DL
BETAUPE: 17.8 %
CREAT 24H UR-MCNC: NORMAL G/24 H
CREATININE UR (MAYO): 49 MG/DL
DEPRECATED KAPPA LC FREE/LAMBDA SER: 0.9 RATIO
DSDNA AB SER-ACNC: <12 IU/ML
ENA JO1 AB SER IA-ACNC: <0.2 AL
ENA RNP AB SER IA-ACNC: 0.2 AL
ENA SM AB SER IA-ACNC: <0.2 AL
ENA SS-A AB SER IA-ACNC: <0.2 AL
ENA SS-B AB SER IA-ACNC: <0.2 AL
GAMMA GLOB FLD ELPH-MCNC: 1.1 G/DL
GAMMA GLOB MFR SERPL ELPH: 15.1 %
GAMMAUPE: 10.9 %
IGA 24H UR QL IFE: NORMAL
IGA SER QL IEP: 186 MG/DL
IGG SER QL IEP: 976 MG/DL
IGM SER QL IEP: 257 MG/DL
INTERPRETATION SERPL IEP-IMP: NORMAL
KAPPA LC 24H UR QL: NORMAL
KAPPA LC CSF-MCNC: 2.51 MG/DL
KAPPA LC SERPL-MCNC: 2.25 MG/DL
M PROTEIN SPEC IFE-MCNC: NORMAL
PROT PATTERN 24H UR ELPH-IMP: NORMAL
PROT SERPL-MCNC: 7.2 G/DL
PROT SERPL-MCNC: 7.2 G/DL
PROT UR-MCNC: 22 MG/DL
PROT UR-MCNC: 22 MG/DL
SPECIMEN VOL 24H UR: NORMAL ML

## 2022-03-30 ENCOUNTER — NON-APPOINTMENT (OUTPATIENT)
Age: 47
End: 2022-03-30

## 2022-05-03 ENCOUNTER — APPOINTMENT (OUTPATIENT)
Dept: DERMATOLOGY | Facility: CLINIC | Age: 47
End: 2022-05-03
Payer: MEDICAID

## 2022-05-03 PROCEDURE — 99214 OFFICE O/P EST MOD 30 MIN: CPT | Mod: 25

## 2022-05-03 PROCEDURE — 11900 INJECT SKIN LESIONS </W 7: CPT

## 2022-05-03 RX ORDER — LEVOCETIRIZINE DIHYDROCHLORIDE 5 MG/1
5 TABLET ORAL
Qty: 1 | Refills: 0 | Status: ACTIVE | COMMUNITY
Start: 2022-05-03 | End: 1900-01-01

## 2022-05-03 RX ORDER — TACROLIMUS 1 MG/G
0.1 OINTMENT TOPICAL
Qty: 60 | Refills: 0 | Status: ACTIVE | COMMUNITY
Start: 2022-05-03 | End: 1900-01-01

## 2022-08-03 ENCOUNTER — APPOINTMENT (OUTPATIENT)
Dept: DERMATOLOGY | Facility: CLINIC | Age: 47
End: 2022-08-03

## 2022-08-03 DIAGNOSIS — L70.9 ACNE, UNSPECIFIED: ICD-10-CM

## 2022-08-03 PROCEDURE — 99214 OFFICE O/P EST MOD 30 MIN: CPT

## 2022-08-04 RX ORDER — TACROLIMUS 1 MG/G
0.1 OINTMENT TOPICAL
Qty: 1 | Refills: 0 | Status: ACTIVE | COMMUNITY
Start: 2022-08-03

## 2022-08-04 RX ORDER — TRETINOIN 0.25 MG/G
0.03 CREAM TOPICAL
Qty: 1 | Refills: 3 | Status: ACTIVE | COMMUNITY
Start: 2022-08-03

## 2022-09-07 ENCOUNTER — APPOINTMENT (OUTPATIENT)
Dept: DERMATOLOGY | Facility: CLINIC | Age: 47
End: 2022-09-07

## 2022-09-07 PROCEDURE — 99213 OFFICE O/P EST LOW 20 MIN: CPT | Mod: 25

## 2022-09-07 PROCEDURE — 95044 PATCH/APPLICATION TESTS: CPT

## 2022-09-09 ENCOUNTER — APPOINTMENT (OUTPATIENT)
Dept: DERMATOLOGY | Facility: CLINIC | Age: 47
End: 2022-09-09

## 2022-09-09 PROCEDURE — 99212 OFFICE O/P EST SF 10 MIN: CPT

## 2022-09-12 ENCOUNTER — APPOINTMENT (OUTPATIENT)
Dept: DERMATOLOGY | Facility: CLINIC | Age: 47
End: 2022-09-12

## 2022-09-12 DIAGNOSIS — B00.9 HERPESVIRAL INFECTION, UNSPECIFIED: ICD-10-CM

## 2022-09-12 DIAGNOSIS — L30.9 DERMATITIS, UNSPECIFIED: ICD-10-CM

## 2022-09-12 PROCEDURE — 99214 OFFICE O/P EST MOD 30 MIN: CPT

## 2022-09-12 RX ORDER — ACYCLOVIR 50 MG/G
5 OINTMENT TOPICAL 3 TIMES DAILY
Qty: 1 | Refills: 0 | Status: ACTIVE | COMMUNITY
Start: 2022-09-12 | End: 1900-01-01

## 2022-09-12 RX ORDER — DESONIDE 0.5 MG/G
0.05 OINTMENT TOPICAL
Qty: 1 | Refills: 3 | Status: ACTIVE | COMMUNITY
Start: 2022-09-12 | End: 1900-01-01

## 2022-12-30 ENCOUNTER — RESULT REVIEW (OUTPATIENT)
Age: 47
End: 2022-12-30

## 2022-12-30 ENCOUNTER — APPOINTMENT (OUTPATIENT)
Dept: BREAST CENTER | Facility: CLINIC | Age: 47
End: 2022-12-30
Payer: MEDICAID

## 2022-12-30 VITALS
WEIGHT: 127 LBS | TEMPERATURE: 97.3 F | BODY MASS INDEX: 27.4 KG/M2 | DIASTOLIC BLOOD PRESSURE: 96 MMHG | SYSTOLIC BLOOD PRESSURE: 158 MMHG | HEART RATE: 88 BPM | HEIGHT: 57 IN

## 2022-12-30 PROCEDURE — 99203 OFFICE O/P NEW LOW 30 MIN: CPT

## 2022-12-30 NOTE — DATA REVIEWED
[FreeTextEntry1] : 9/12/22 [facility], Bilat sMMG: Het dense. L- 6:00 there is a small group of microcalcs which appear to be linearly oriented. L dMMG recommended. Additional scattered benign calc are noted. R- superior, in the lateral aspect 3cm behind the nipple there is a question of architectural distortion. R dMMG and targeted US rec. BR0\par \par 9/27/22 [facility], bilat dMMG and US: Het dense. R- previously questioned possible area of architectural distortion in the upper R breast effaces on spot compression. **L- cluster of coarse het calcs is seen in the lower central L breast 6:00 4cmfn, these cals are indeterminant. Further eval with stereotactic bx is rec. \par US: R- 10:00 2cmfn is a deeply located 0.9 x 0.4 x 1.0cm cyst. No susp finding seen in R breast or axilla.\par L- 12:00 1cmfn 0.8 x 0.5 x 0.8cm circumscribed solid oval hypoechoic mass. *This is probably benign finding and 6 mos f/u US is rec. 12:00 1cmfn 0.3 x 0.3 x 0.3 cm cyst. 8:00 6cmfn 0.5cm cyst. 9:00 9cmfn 0.6cm cyst.\par Impression: L stereotactic bx is rec 6:00. L 6 mos f/u US for 12:00 0.8cm mass. Right no evidence of malignancy. BR4\par \par 10/20/22 [facility], L stereotactic bx. Path: Proliferative and nonproliferative breast changes without atypia characterized by usual type ductal hyperplasia, sclerosing adenosis, columnar cell change, cysts, apocrine metaplasia, pseudoangiomatous stromal hyperplasia and dense stromal fibrosis. Calcs are associated with benign breast tissue. Results are Benign and Concordant. \par

## 2022-12-30 NOTE — HISTORY OF PRESENT ILLNESS
[FreeTextEntry1] : Liza Ortega is a 47 year old Polish speaking female here today for consultation s/p L breast biopsy.\par She was offered  services but requested that her  Mauricio translate for her. \par Referred by iRchie Thomas DO\par PCP Dr. Keith Leventhal\par Pt denies any breast lesions, discharge or masses.\par \par 9/12/22 [facility], Bilat sMMG: Het dense. L- 6:00 there is a small group of microcalcs which appear to be linearly oriented. L dMMG recommended. Additional scattered benign calc are noted. R- superior, in the lateral aspect 3cm behind the nipple there is a question of architectural distortion. R dMMG and targeted US rec. BR0\par \par 9/27/22 [facility], bilat dMMG and US: Het dense. R- previously questioned possible area of architectural distortion in the upper R breast effaces on spot compression. **L- cluster of coarse het calcs is seen in the lower central L breast 6:00 4cmfn, these cals are indeterminant. Further eval with stereotactic bx is rec. \par US: R- 10:00 2cmfn is a deeply located 0.9 x 0.4 x 1.0cm cyst. No susp finding seen in R breast or axilla.\par L- 12:00 1cmfn 0.8 x 0.5 x 0.8cm circumscribed solid oval hypoechoic mass. *This is probably benign finding and 6 mos f/u US is rec. 12:00 1cmfn 0.3 x 0.3 x 0.3 cm cyst. 8:00 6cmfn 0.5cm cyst. 9:00 9cmfn 0.6cm cyst.\par Impression: L stereotactic bx is rec 6:00. L 6 mos f/u US for 12:00 0.8cm mass. Right no evidence of malignancy. BR4\par \par 10/20/22 [facility], L stereotactic bx. Path: Proliferative and nonproliferative breast changes without atypia characterized by usual type ductal hyperplasia, sclerosing adenosis, columnar cell change, cysts, apocrine metaplasia, pseudoangiomatous stromal hyperplasia and dense stromal fibrosis. Calcs are associated with benign breast tissue. Results are Benign and Concordant. \par \par Fhx: None. Not AJ

## 2022-12-30 NOTE — PHYSICAL EXAM
[Bra Size: ___] : Bra Size: [unfilled] [Normocephalic] : normocephalic [Atraumatic] : atraumatic [Supple] : supple [No Supraclavicular Adenopathy] : no supraclavicular adenopathy [No Thyromegaly] : no thyromegaly [Examined in the supine and seated position] : examined in the supine and seated position [Symmetrical] : symmetrical [No dominant masses] : no dominant masses in right breast  [No dominant masses] : no dominant masses left breast [No Nipple Retraction] : no left nipple retraction [No Nipple Discharge] : no left nipple discharge [No Axillary Lymphadenopathy] : no left axillary lymphadenopathy [No Edema] : no edema [No Swelling] : no swelling [Full ROM] : full range of motion [No Rashes] : no rashes [No Ulceration] : no ulceration

## 2022-12-30 NOTE — PAST MEDICAL HISTORY
[Menarche Age ____] : age at menarche was [unfilled] [Definite ___ (Date)] : the last menstrual period was [unfilled] [Total Preg ___] : G[unfilled] [Live Births ___] : P[unfilled]  [Living ___] : Living: [unfilled] [Age At Live Birth ___] : Age at live birth: [unfilled]

## 2022-12-30 NOTE — REVIEW OF SYSTEMS
[Negative] : Heme/Lymph [Recent Weight Loss (___ Lbs)] : recent [unfilled] ~Ulb weight loss [FreeTextEntry2] : Due to stress and family drama lost 10 lbs in a month

## 2022-12-30 NOTE — CONSULT LETTER
[Dear  ___] : Dear  [unfilled], [Consult Letter:] : I had the pleasure of evaluating your patient, [unfilled]. [Please see my note below.] : Please see my note below. [Consult Closing:] : Thank you very much for allowing me to participate in the care of this patient.  If you have any questions, please do not hesitate to contact me. [Sincerely,] : Sincerely, [FreeTextEntry3] : Sienna De Leon MD

## 2022-12-30 NOTE — ASSESSMENT
[FreeTextEntry1] : Liza Ortega is a 47 year old Occitan speaking female here today for consultation s/p L breast biopsy.\par She was offered  services but requested that her  Mauricio translate for her. \par Referred by Richie Thomas DO\par PCP Dr. Keith Leventhal\par Pt denies any breast lesions, discharge or masses.\par \par 9/12/22 [facility], Bilat sMMG: Het dense. L- 6:00 there is a small group of microcalcs which appear to be linearly oriented. L dMMG recommended. Additional scattered benign calc are noted. R- superior, in the lateral aspect 3cm behind the nipple there is a question of architectural distortion. R dMMG and targeted US rec. BR0\par \par 9/27/22 [facility], bilat dMMG and US: Het dense. R- previously questioned possible area of architectural distortion in the upper R breast effaces on spot compression. **L- cluster of coarse het calcs is seen in the lower central L breast 6:00 4cmfn, these cals are indeterminant. Further eval with stereotactic bx is rec. \par US: R- 10:00 2cmfn is a deeply located 0.9 x 0.4 x 1.0cm cyst. No susp finding seen in R breast or axilla.\par L- 12:00 1cmfn 0.8 x 0.5 x 0.8cm circumscribed solid oval hypoechoic mass. *This is probably benign finding and 6 mos f/u US is rec. 12:00 1cmfn 0.3 x 0.3 x 0.3 cm cyst. 8:00 6cmfn 0.5cm cyst. 9:00 9cmfn 0.6cm cyst.\par Impression: L stereotactic bx is rec 6:00. L 6 mos f/u US for 12:00 0.8cm mass. Right no evidence of malignancy. BR4\par \par 10/20/22 [facility], L stereotactic bx. Path: Proliferative and nonproliferative breast changes without atypia characterized by usual type ductal hyperplasia, sclerosing adenosis, columnar cell change, cysts, apocrine metaplasia, pseudoangiomatous stromal hyperplasia and dense stromal fibrosis. Calcs are associated with benign breast tissue. Results are Benign and Concordant. \par \par Fhx: None for cancer. Not AJ\par CBE: DANA, small scar from core bx on left 4:00. FCC, No axillary or SC lymphadenopathy. \par Reviewed studies and benign results with pt and . REc is for 6 mos f.u left mmg and u/s and then f/u after with us. [place] will go to [place], rec they bring prior study CDs with them.

## 2023-03-28 ENCOUNTER — APPOINTMENT (OUTPATIENT)
Dept: MAMMOGRAPHY | Facility: CLINIC | Age: 48
End: 2023-03-28
Payer: MEDICAID

## 2023-03-28 ENCOUNTER — RESULT REVIEW (OUTPATIENT)
Age: 48
End: 2023-03-28

## 2023-03-28 ENCOUNTER — APPOINTMENT (OUTPATIENT)
Dept: ULTRASOUND IMAGING | Facility: CLINIC | Age: 48
End: 2023-03-28
Payer: MEDICAID

## 2023-03-28 PROCEDURE — 77061 BREAST TOMOSYNTHESIS UNI: CPT | Mod: LT

## 2023-03-28 PROCEDURE — 77065 DX MAMMO INCL CAD UNI: CPT | Mod: RT

## 2023-03-28 PROCEDURE — 76642 ULTRASOUND BREAST LIMITED: CPT | Mod: LT

## 2023-03-28 PROCEDURE — G0279: CPT | Mod: LT

## 2023-03-29 ENCOUNTER — NON-APPOINTMENT (OUTPATIENT)
Age: 48
End: 2023-03-29

## 2023-03-29 ENCOUNTER — RESULT REVIEW (OUTPATIENT)
Age: 48
End: 2023-03-29

## 2023-03-29 DIAGNOSIS — R92.8 OTHER ABNORMAL AND INCONCLUSIVE FINDINGS ON DIAGNOSTIC IMAGING OF BREAST: ICD-10-CM

## 2023-03-30 ENCOUNTER — OUTPATIENT (OUTPATIENT)
Dept: OUTPATIENT SERVICES | Facility: HOSPITAL | Age: 48
LOS: 1 days | End: 2023-03-30
Payer: COMMERCIAL

## 2023-03-30 ENCOUNTER — APPOINTMENT (OUTPATIENT)
Dept: ULTRASOUND IMAGING | Facility: CLINIC | Age: 48
End: 2023-03-30
Payer: MEDICAID

## 2023-03-30 DIAGNOSIS — R10.813 RIGHT LOWER QUADRANT ABDOMINAL TENDERNESS: ICD-10-CM

## 2023-03-30 DIAGNOSIS — I51.89 OTHER ILL-DEFINED HEART DISEASES: Chronic | ICD-10-CM

## 2023-03-30 PROCEDURE — 76856 US EXAM PELVIC COMPLETE: CPT

## 2023-03-30 PROCEDURE — 76700 US EXAM ABDOM COMPLETE: CPT | Mod: 26

## 2023-03-30 PROCEDURE — 76856 US EXAM PELVIC COMPLETE: CPT | Mod: 26

## 2023-03-30 PROCEDURE — 76700 US EXAM ABDOM COMPLETE: CPT

## 2023-05-08 ENCOUNTER — APPOINTMENT (OUTPATIENT)
Dept: BREAST CENTER | Facility: CLINIC | Age: 48
End: 2023-05-08
Payer: MEDICAID

## 2023-05-08 VITALS
TEMPERATURE: 97.3 F | BODY MASS INDEX: 27.4 KG/M2 | HEART RATE: 67 BPM | HEIGHT: 57 IN | WEIGHT: 127 LBS | DIASTOLIC BLOOD PRESSURE: 73 MMHG | SYSTOLIC BLOOD PRESSURE: 143 MMHG

## 2023-05-08 DIAGNOSIS — R92.2 INCONCLUSIVE MAMMOGRAM: ICD-10-CM

## 2023-05-08 DIAGNOSIS — R92.0 MAMMOGRAPHIC MICROCALCIFICATION FOUND ON DIAGNOSTIC IMAGING OF BREAST: ICD-10-CM

## 2023-05-08 PROCEDURE — 99213 OFFICE O/P EST LOW 20 MIN: CPT

## 2023-05-08 NOTE — DATA REVIEWED
[FreeTextEntry1] : 3/28/23 [place],  dMMG and US: Extremely dense. Bx clip within residual calcs in the region of previous benign stereotactic bx in the L lower central breast. No susp mass, microcals or other sign of malignancy is identified. US: Circumscribed hypoechoic mass 12:00 1cmfn measuring 0.8x0.5x0.8cm, stable since 9/2022. This finding is probably benign, continues short interval f/u is recommended. Impression: MMG negative. Stable probably benign L breast mass. Targeted L US at time of annual mmg is rec. BR3

## 2023-05-08 NOTE — CONSULT LETTER
[Dear  ___] : Dear  [unfilled], [Courtesy Letter:] : I had the pleasure of seeing your patient, [unfilled], in my office today. [Please see my note below.] : Please see my note below. [Consult Closing:] : Thank you very much for allowing me to participate in the care of this patient.  If you have any questions, please do not hesitate to contact me. [Sincerely,] : Sincerely, [FreeTextEntry3] : Sienna De Leon MD

## 2023-05-08 NOTE — ASSESSMENT
[FreeTextEntry1] : Referred by Richie Thomas DO\par PCP Dr. Keith Leventhal\par \par Liza Ortega is a 48 year old Gren[place] speaking female here today for f/u after left mmg and u/s and c/o  R sided cyclical breast pain. Patient states right sided breast pain occurs with her menses, she denies taking analgesic for the pain. Drinks 3 cups of coffee a day.\par Pt denies any breast lesions, discharge or masses.\par \par 9/12/22 [facility], Bilat sMMG: Het dense. L- 6:00 there is a small group of microcalcs which appear to be linearly oriented. L dMMG recommended. Additional scattered benign calc are noted. R- superior, in the lateral aspect 3cm behind the nipple there is a question of architectural distortion. R dMMG and targeted US rec. BR0\par 9/27/22 [facility], bilat dMMG and US: Het dense. R- previously questioned possible area of architectural distortion in the upper R breast effaces on spot compression. **L- cluster of coarse het calcs is seen in the lower central L breast 6:00 4cmfn, these cals are indeterminant. Further eval with stereotactic bx is rec. \par US: R- 10:00 2cmfn is a deeply located 0.9 x 0.4 x 1.0cm cyst. No susp finding seen in R breast or axilla.\par L- 12:00 1cmfn 0.8 x 0.5 x 0.8cm circumscribed solid oval hypoechoic mass. *This is probably benign finding and 6 mos f/u US is rec. 12:00 1cmfn 0.3 x 0.3 x 0.3 cm cyst. 8:00 6cmfn 0.5cm cyst. 9:00 9cmfn 0.6cm cyst.\par Impression: L stereotactic bx is rec 6:00. L 6 mos f/u US for 12:00 0.8cm mass. Right no evidence of malignancy. BR4\par 10/20/22 [facility], L stereotactic bx. Path: Proliferative and nonproliferative breast changes without atypia characterized by usual type ductal hyperplasia, sclerosing adenosis, columnar cell change, cysts, apocrine metaplasia, pseudoangiomatous stromal hyperplasia and dense stromal fibrosis. Calcs are associated with benign breast tissue. Results are Benign and Concordant. \par \par 3/28/23 [place], L dMMG and US: Extremely dense. Bx clip within residual calcs in the region of previous benign stereotactic bx in the L lower central breast. No susp mass, microcals or other sign of malignancy is identified. US: Circumscribed hypoechoic mass 12:00 1cmfn measuring 0.8x0.5x0.8cm, stable since 9/2022. This finding is probably benign, continues short interval f/u is recommended. Impression: MMG negative. Stable probably benign L breast mass. Targeted L US at time of annual mmg is rec. BR3 \par \par Fhx: None for cancer. Not AJ\par CBE: FCC R>L, No axillary or SC lymphadenopathy. \par Recommend Ibuprofen as needed for mastalgia, decrease coffee consumption also recommended. Due for sMMG and and US including L targeted 9/23. Patient lives in [place], made her aware we are happy to continue to see her but given her long commute to our office, contact for Dr. Silvia Reyes also provided.

## 2023-05-08 NOTE — HISTORY OF PRESENT ILLNESS
[FreeTextEntry1] : Referred by Richie Thomas DO\par PCP Dr. Keith Leventhal\par \par Liza Ortega is a 48 year old Micronesian speaking female here today for f/u after left mgm and u/s and R sided cyclical breast pain. Patient states right sided breast pain occurs with her menses, she denies taking analgesic for the pain. Drinks 3 cups of coffee a day.\par Pt denies any breast lesions, discharge or masses.\par \par 9/12/22 [facility], Bilat sMMG: Het dense. L- 6:00 there is a small group of microcalcs which appear to be linearly oriented. L dMMG recommended. Additional scattered benign calc are noted. R- superior, in the lateral aspect 3cm behind the nipple there is a question of architectural distortion. R dMMG and targeted US rec. BR0\par 9/27/22 [facility], bilat dMMG and US: Het dense. R- previously questioned possible area of architectural distortion in the upper R breast effaces on spot compression. **L- cluster of coarse het calcs is seen in the lower central L breast 6:00 4cmfn, these cals are indeterminant. Further eval with stereotactic bx is rec. \par US: R- 10:00 2cmfn is a deeply located 0.9 x 0.4 x 1.0cm cyst. No susp finding seen in R breast or axilla.\par L- 12:00 1cmfn 0.8 x 0.5 x 0.8cm circumscribed solid oval hypoechoic mass. *This is probably benign finding and 6 mos f/u US is rec. 12:00 1cmfn 0.3 x 0.3 x 0.3 cm cyst. 8:00 6cmfn 0.5cm cyst. 9:00 9cmfn 0.6cm cyst.\par Impression: L stereotactic bx is rec 6:00. L 6 mos f/u US for 12:00 0.8cm mass. Right no evidence of malignancy. BR4\par 10/20/22 [facility], L stereotactic bx. Path: Proliferative and nonproliferative breast changes without atypia characterized by usual type ductal hyperplasia, sclerosing adenosis, columnar cell change, cysts, apocrine metaplasia, pseudoangiomatous stromal hyperplasia and dense stromal fibrosis. Calcs are associated with benign breast tissue. Results are Benign and Concordant. \par \par 3/28/23 [place],  dMMG and US: Extremely dense. Bx clip within residual calcs in the region of previous benign stereotactic bx in the L lower central breast. No susp mass, microcals or other sign of malignancy is identified. US: Circumscribed hypoechoic mass 12:00 1cmfn measuring 0.8x0.5x0.8cm, stable since 9/2022. This finding is probably benign, continues short interval f/u is recommended. Impression: MMG negative. Stable probably benign L breast mass. Targeted L US at time of annual mmg is rec. BR3 \par \par Fhx: None for cancer. Not AJ\par

## 2023-05-23 ENCOUNTER — OUTPATIENT (OUTPATIENT)
Dept: OUTPATIENT SERVICES | Facility: HOSPITAL | Age: 48
LOS: 1 days | End: 2023-05-23
Payer: MEDICAID

## 2023-05-23 ENCOUNTER — APPOINTMENT (OUTPATIENT)
Dept: CT IMAGING | Facility: CLINIC | Age: 48
End: 2023-05-23
Payer: MEDICAID

## 2023-05-23 DIAGNOSIS — I51.89 OTHER ILL-DEFINED HEART DISEASES: Chronic | ICD-10-CM

## 2023-05-23 DIAGNOSIS — R10.813 RIGHT LOWER QUADRANT ABDOMINAL TENDERNESS: ICD-10-CM

## 2023-05-23 DIAGNOSIS — Z00.00 ENCOUNTER FOR GENERAL ADULT MEDICAL EXAMINATION WITHOUT ABNORMAL FINDINGS: ICD-10-CM

## 2023-05-23 PROCEDURE — 74176 CT ABD & PELVIS W/O CONTRAST: CPT

## 2023-05-23 PROCEDURE — 74176 CT ABD & PELVIS W/O CONTRAST: CPT | Mod: 26

## 2023-12-09 ENCOUNTER — OFFICE (OUTPATIENT)
Dept: URBAN - METROPOLITAN AREA CLINIC 34 | Facility: CLINIC | Age: 48
Setting detail: OPHTHALMOLOGY
End: 2023-12-09
Payer: COMMERCIAL

## 2023-12-09 DIAGNOSIS — H00.022: ICD-10-CM

## 2023-12-09 DIAGNOSIS — H01.004: ICD-10-CM

## 2023-12-09 DIAGNOSIS — H11.153: ICD-10-CM

## 2023-12-09 DIAGNOSIS — H00.025: ICD-10-CM

## 2023-12-09 DIAGNOSIS — H01.001: ICD-10-CM

## 2023-12-09 DIAGNOSIS — H25.13: ICD-10-CM

## 2023-12-09 PROCEDURE — 92004 COMPRE OPH EXAM NEW PT 1/>: CPT | Performed by: OPHTHALMOLOGY

## 2023-12-09 ASSESSMENT — REFRACTION_MANIFEST
OD_VA1: 20/25-1
OS_SPHERE: +1.25
OD_CYLINDER: +0.75
OD_SPHERE: +0.50
OS_CYLINDER: +0.50
OS_VA1: 20/25-2
OS_AXIS: 075
OD_AXIS: 085

## 2023-12-09 ASSESSMENT — LID EXAM ASSESSMENTS
OS_COMMENTS: MISSING GLANDS
OD_MEIBOMITIS: RLL 1+
OD_BLEPHARITIS: RUL T
OS_MEIBOMITIS: LLL 1+
OS_BLEPHARITIS: LUL T

## 2023-12-09 ASSESSMENT — SPHEQUIV_DERIVED
OS_SPHEQUIV: 1.5
OD_SPHEQUIV: 0.875
OS_SPHEQUIV: 1.5
OD_SPHEQUIV: 0.875

## 2023-12-09 ASSESSMENT — REFRACTION_AUTOREFRACTION
OS_CYLINDER: +0.50
OS_SPHERE: +1.25
OD_CYLINDER: +0.75
OD_SPHERE: +0.50
OS_AXIS: 073
OD_AXIS: 086

## 2023-12-09 ASSESSMENT — CONFRONTATIONAL VISUAL FIELD TEST (CVF)
OD_FINDINGS: FULL
OS_FINDINGS: FULL

## 2024-01-05 ENCOUNTER — APPOINTMENT (OUTPATIENT)
Dept: ULTRASOUND IMAGING | Facility: CLINIC | Age: 49
End: 2024-01-05
Payer: MEDICAID

## 2024-01-05 ENCOUNTER — RESULT REVIEW (OUTPATIENT)
Age: 49
End: 2024-01-05

## 2024-01-05 ENCOUNTER — APPOINTMENT (OUTPATIENT)
Dept: MAMMOGRAPHY | Facility: CLINIC | Age: 49
End: 2024-01-05
Payer: MEDICAID

## 2024-01-05 PROCEDURE — 76641 ULTRASOUND BREAST COMPLETE: CPT | Mod: 50

## 2024-01-05 PROCEDURE — 77065 DX MAMMO INCL CAD UNI: CPT | Mod: GG,RT

## 2024-01-17 ENCOUNTER — NON-APPOINTMENT (OUTPATIENT)
Age: 49
End: 2024-01-17

## 2024-01-17 DIAGNOSIS — R92.8 OTHER ABNORMAL AND INCONCLUSIVE FINDINGS ON DIAGNOSTIC IMAGING OF BREAST: ICD-10-CM

## 2024-02-18 ENCOUNTER — OUTPATIENT (OUTPATIENT)
Dept: OUTPATIENT SERVICES | Facility: HOSPITAL | Age: 49
LOS: 1 days | End: 2024-02-18
Payer: MEDICAID

## 2024-02-18 ENCOUNTER — APPOINTMENT (OUTPATIENT)
Dept: CT IMAGING | Facility: IMAGING CENTER | Age: 49
End: 2024-02-18
Payer: MEDICAID

## 2024-02-18 DIAGNOSIS — R19.00 INTRA-ABDOMINAL AND PELVIC SWELLING, MASS AND LUMP, UNSPECIFIED SITE: ICD-10-CM

## 2024-02-18 DIAGNOSIS — I51.89 OTHER ILL-DEFINED HEART DISEASES: Chronic | ICD-10-CM

## 2024-02-18 PROCEDURE — 74177 CT ABD & PELVIS W/CONTRAST: CPT | Mod: 26

## 2024-02-18 PROCEDURE — 74177 CT ABD & PELVIS W/CONTRAST: CPT

## 2024-02-19 ENCOUNTER — APPOINTMENT (OUTPATIENT)
Dept: ORTHOPEDIC SURGERY | Facility: CLINIC | Age: 49
End: 2024-02-19

## 2024-02-27 NOTE — ED PROVIDER NOTE - SHIFT CHANGE DETAILS
START TIME: 9:10a  END TIME:10  TOTAL TIME FACE TO FACE: 50 mins    Subjective   Patient ID: Winston Khan is a 36 y.o. female who presents for   Problem List Items Addressed This Visit       Generalized anxiety disorder - Primary   .    Virtual or Telephone Consent    An interactive audio and video telecommunication system which permits real time communications between the patient (at the originating site) and provider (at the distant site) was utilized to provide this telehealth service.   Verbal consent was requested and obtained from Winston Khan on this date, 02/27/24 for a telehealth visit.      CONTENT OF SESSION: This was a followup appointment (session 10) between provider and patient to address symptoms of postpartum anxiety and OCD. winston is still seeing Dr. Walsh for medication management with positive effect.     Focus of session was on Winston's adjustment to current pregnancy (around 15 wga). Winston reported overall continued decrease in anxiety, though has noticed some preoccupying worry thoughts and difficulty relaxing, as well as excessive guilt. She discussed recent incidents with triggered worry/guilt thoughts. Provider used Socratic questioning to facilitate cognitive restructuring of potential unrealistic or unhelpful cognitions and patient was able to engage in cognitive restructuring. Time was spent on problem solving around strategies to increase relaxation and mindfulness practice. Provider gave psychoeducation on nature and function of anxiety, and role of behavior in anxiety maintenance. Provider gave supportive counseling and normalized common emotional experiences.    Objective   Social History     Substance and Sexual Activity   Alcohol Use Yes    Alcohol/week: 3.0 standard drinks of alcohol    Types: 3 Standard drinks or equivalent per week      Social History     Social History Narrative    Not on file        Assessment/Plan   Problem List Items Addressed This Visit        Generalized anxiety disorder - Primary     PLAN: Plan made for individual CBT to address OCD on monthly basis. She will continue with Dr. Travis Walsh for medication management.      Attending MD Au: 43F with 5cm cyst with mod vol hemoperitoneum, not on AC, attempting pain control, reassess

## 2024-03-07 ENCOUNTER — APPOINTMENT (OUTPATIENT)
Dept: ORTHOPEDIC SURGERY | Facility: CLINIC | Age: 49
End: 2024-03-07
Payer: COMMERCIAL

## 2024-03-07 VITALS — HEIGHT: 57 IN

## 2024-03-07 PROCEDURE — 73130 X-RAY EXAM OF HAND: CPT | Mod: 50

## 2024-03-07 PROCEDURE — 99203 OFFICE O/P NEW LOW 30 MIN: CPT

## 2024-03-07 NOTE — HISTORY OF PRESENT ILLNESS
[de-identified] : 3/7/24: 48yo RHD female () presents for RIGHT wrist pain and BILATERAL middle/ring/small finger numbness. Reports swelling of RIGHT dorsal hand 2 months ago. Numbness is intermittent. Reports that she tried wearing her daughter's wrist brace, but it made the numbness worse.  PMH: HLD. Fibroma. [FreeTextEntry1] : BILATERAL hand  [FreeTextEntry5] : CHRISTIANA ace [RHD] 49 year old female is here today c/o RIGHT > LEFT hand pain and numbness x 3 years w/o injury. c/o numbness (3rd, 4th, 5th digits R>L hand) with prolonged activity. states PCP recommended brace, numbness increased. denies prior injury to hands.

## 2024-03-07 NOTE — IMAGING
[de-identified] : LEFT HAND skin intact. no swelling. no TTP. good elbow extension, flexion. good pronation, supination. wrist ROM: good extension, flexion. good EPL, FPL. good finger extension, flex to full fist. good finger abduction and adduction.  SILT to median, ulnar, radial distributions. palpable radial pulse, brisk cap refill all digits.  5/5, 1st DI 5/5, APB 5/5. no triggering. + Tinel's at carpal tunnel. negative Tinel's at cubital tunnel.   RIGHT HAND skin intact. no swelling. TTP to lunate. good elbow extension, flexion. good pronation, supination. wrist ROM: good extension, flexion. good EPL, FPL. good finger extension, flex to full fist. good finger abduction and adduction.  SILT to median, ulnar, radial distributions. palpable radial pulse, brisk cap refill all digits.  5/5, 1st DI 5-/5, APB 5/5. no triggering. + Tinel's at carpal tunnel. negative Tinel's at cubital tunnel.   XRAYS OF LEFT HAND: no acute displaced fracture or dislocation. mild djd of thumb CMCJ. XRAYS OF RIGHT HAND: no acute displaced fracture or dislocation. cystic changes to ulnar aspect of lunate.

## 2024-03-07 NOTE — ASSESSMENT
[FreeTextEntry1] : The condition was explained to the patient.  Discussed natural history of ulnar abutment, with likely progression of degenerative changes.  Discussed risks and benefits of surgical vs non-surgical treatment - activity modification, brace, NSAID, steroid injection, surgery. - Recommend RIGHT wrist brace, activity modification, OTC pain medication PRN pain.  - EDX to evaluate CTS. - recommend bilateral carpal tunnel night splint. - activity modification PRN.  F/u after EDX.

## 2024-05-01 ENCOUNTER — APPOINTMENT (OUTPATIENT)
Dept: ORTHOPEDIC SURGERY | Facility: CLINIC | Age: 49
End: 2024-05-01
Payer: COMMERCIAL

## 2024-05-01 DIAGNOSIS — G56.02 CARPAL TUNNEL SYNDROME, LEFT UPPER LIMB: ICD-10-CM

## 2024-05-01 PROCEDURE — 99214 OFFICE O/P EST MOD 30 MIN: CPT

## 2024-05-01 NOTE — IMAGING
[de-identified] : LEFT HAND skin intact. no swelling. no TTP. good elbow extension, flexion. good pronation, supination. wrist ROM: good extension, flexion. good EPL, FPL. good finger extension, flex to full fist. good finger abduction and adduction.  SILT to median, ulnar, radial distributions. palpable radial pulse, brisk cap refill all digits.  5/5, 1st DI 5/5, APB 5/5. no triggering. + Tinel's at carpal tunnel. negative Tinel's at cubital tunnel.   RIGHT HAND skin intact. no swelling. TTP to lunate. good elbow extension, flexion. good pronation, supination. wrist ROM: good extension, flexion. good EPL, FPL. good finger extension, flex to full fist. good finger abduction and adduction.  SILT to median, ulnar, radial distributions. palpable radial pulse, brisk cap refill all digits.  5/5, 1st DI 5-/5, APB 5/5. no triggering. + Tinel's at carpal tunnel. negative Tinel's at cubital tunnel.

## 2024-05-01 NOTE — HISTORY OF PRESENT ILLNESS
[de-identified] : 5/1/24: presents with daughter - assisting with HPI and translation ([place]) - f/u EDX for bilateral CTS. - R ulnar abutment.  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 - R median: DML 4.2 ms, DSL 4.1 ms. - L ulnar: 67 m/s above to below elbow, 69 m/s below elbow to wrist. - R ulnar: 67 m/s above to below elbow, 71 m/s below elbow to wrist.  3/7/24: 50yo RHD female () presents for RIGHT wrist pain and BILATERAL middle/ring/small finger numbness. Reports swelling of RIGHT dorsal hand 2 months ago. Numbness is intermittent. Reports that she tried wearing her daughter's wrist brace, but it made the numbness worse.  PMH: GERD. HTN. HLD. Uterine fibroma. PSH: lumbar discectomy. NKDA. [FreeTextEntry1] : BILATERAL hand  [FreeTextEntry5] : CHRISTIANA is here today for BILATERAL hand EMG results. pt states since last visit, pain increased due to cleaning/working more. c/o increased RIGHT > LEFT symptoms.

## 2024-05-01 NOTE — ASSESSMENT
[FreeTextEntry1] : EDX reviewed with patient - bilateral CTS. The condition was explained to the patient.  Discussed risks and benefits of surgical vs non-surgical treatment. Patient would like to proceed with surgery.  Risks of surgery include, but are not limited to bleeding, infection, persistent pain, stiffness, recurrence, need for future surgery, injury to surrounding tendons and neurovascular structures, hypersensitivity, cold sensitivity, CRPS, and loss of function. Goal of surgery is to decompress the nerve and halt progression of neuropathy. Anticipate maximal nerve recovery at 1 year post-op. May have residual nerve deficits (eg pain, numbness, tingling, weakness, atrophy) depending on severity of neuropathy and nerve damage.  Risk of scar sensitivity and pillar pain for several months post-operatively. Surgery also carries a risk of complications related to anesthesia. All patient questions were answered. Patient expressed understanding and would like to proceed with RIGHT carpal tunnel release.  F/u after surgery. Anticipate OOW for 4-6 weeks post-operatively. Plan to address LEFT side after RIGHT side has recovered.  Patient understands that surgery for CTS would not address pain from ulnar abutment.

## 2024-05-02 ENCOUNTER — NON-APPOINTMENT (OUTPATIENT)
Age: 49
End: 2024-05-02

## 2024-05-07 ENCOUNTER — OUTPATIENT (OUTPATIENT)
Dept: OUTPATIENT SERVICES | Facility: HOSPITAL | Age: 49
LOS: 1 days | Discharge: HOME | End: 2024-05-07

## 2024-05-07 DIAGNOSIS — Z01.818 ENCOUNTER FOR OTHER PREPROCEDURAL EXAMINATION: ICD-10-CM

## 2024-05-07 DIAGNOSIS — E78.5 HYPERLIPIDEMIA, UNSPECIFIED: ICD-10-CM

## 2024-05-07 DIAGNOSIS — I10 ESSENTIAL (PRIMARY) HYPERTENSION: ICD-10-CM

## 2024-05-07 DIAGNOSIS — I51.89 OTHER ILL-DEFINED HEART DISEASES: Chronic | ICD-10-CM

## 2024-05-07 DIAGNOSIS — K21.9 GASTRO-ESOPHAGEAL REFLUX DISEASE WITHOUT ESOPHAGITIS: ICD-10-CM

## 2024-05-07 DIAGNOSIS — G56.01 CARPAL TUNNEL SYNDROME, RIGHT UPPER LIMB: ICD-10-CM

## 2024-05-07 DIAGNOSIS — Z98.890 OTHER SPECIFIED POSTPROCEDURAL STATES: Chronic | ICD-10-CM

## 2024-05-07 LAB
ANION GAP SERPL CALC-SCNC: 6 MMOL/L — SIGNIFICANT CHANGE UP (ref 5–17)
BUN SERPL-MCNC: 19 MG/DL — SIGNIFICANT CHANGE UP (ref 7–23)
CALCIUM SERPL-MCNC: 9.3 MG/DL — SIGNIFICANT CHANGE UP (ref 8.5–10.1)
CHLORIDE SERPL-SCNC: 103 MMOL/L — SIGNIFICANT CHANGE UP (ref 96–108)
CO2 SERPL-SCNC: 29 MMOL/L — SIGNIFICANT CHANGE UP (ref 22–31)
CREAT SERPL-MCNC: 0.95 MG/DL — SIGNIFICANT CHANGE UP (ref 0.5–1.3)
EGFR: 73 ML/MIN/1.73M2 — SIGNIFICANT CHANGE UP
GLUCOSE SERPL-MCNC: 132 MG/DL — HIGH (ref 70–99)
HCG SERPL-ACNC: <1 MIU/ML — SIGNIFICANT CHANGE UP
HCT VFR BLD CALC: 37.2 % — SIGNIFICANT CHANGE UP (ref 34.5–45)
HGB BLD-MCNC: 12.8 G/DL — SIGNIFICANT CHANGE UP (ref 11.5–15.5)
MCHC RBC-ENTMCNC: 28.7 PG — SIGNIFICANT CHANGE UP (ref 27–34)
MCHC RBC-ENTMCNC: 34.4 G/DL — SIGNIFICANT CHANGE UP (ref 32–36)
MCV RBC AUTO: 83.4 FL — SIGNIFICANT CHANGE UP (ref 80–100)
NRBC # BLD: 0 /100 WBCS — SIGNIFICANT CHANGE UP (ref 0–0)
PLATELET # BLD AUTO: 266 K/UL — SIGNIFICANT CHANGE UP (ref 150–400)
POTASSIUM SERPL-MCNC: 2.9 MMOL/L — CRITICAL LOW (ref 3.5–5.3)
POTASSIUM SERPL-SCNC: 2.9 MMOL/L — CRITICAL LOW (ref 3.5–5.3)
RBC # BLD: 4.46 M/UL — SIGNIFICANT CHANGE UP (ref 3.8–5.2)
RBC # FLD: 13.7 % — SIGNIFICANT CHANGE UP (ref 10.3–14.5)
SODIUM SERPL-SCNC: 138 MMOL/L — SIGNIFICANT CHANGE UP (ref 135–145)
WBC # BLD: 6.05 K/UL — SIGNIFICANT CHANGE UP (ref 3.8–10.5)
WBC # FLD AUTO: 6.05 K/UL — SIGNIFICANT CHANGE UP (ref 3.8–10.5)

## 2024-05-07 NOTE — H&P PST ADULT - NS SC CAGE ALCOHOL EYE OPENER
Patient is scheduled to follow up with Jaci Gutierrez NP on 9/13 with a repeat head CT to be done prior to the visit. The head CT hasn't been done. I called patient regarding the above, but I received no answer. I LVM letting pt know we are cancelling her appt for tomorrow and asked that she call us to reschedule her appt for after her head CT. no

## 2024-05-07 NOTE — PROVIDER CONTACT NOTE (CRITICAL VALUE NOTIFICATION) - ACTION/TREATMENT ORDERED:
pt aware of potassium results, and denies any chest pain palpitations or sob     pt pcp also made aware of lab results.

## 2024-05-07 NOTE — H&P PST ADULT - NSICDXPASTMEDICALHX_GEN_ALL_CORE_FT
PAST MEDICAL HISTORY:  Anemia     Asthma     Depression     GERD (gastroesophageal reflux disease)     HTN (hypertension)     Hyperlipidemia has not been prescribed medication as of yet    Liver disorder "the Doctor at the clinic told me my liver's inflamed"    Spinal stenosis of lumbar region

## 2024-05-07 NOTE — H&P PST ADULT - NEGATIVE GENERAL GENITOURINARY SYMPTOMS
Pt d/c'd in care of wife, Writer went through AVS including Rx, D/c instructions, and follow up. SALLIE Schaefer removed PIV, no adverse s/sx. Pt wife verbalized understanding, Pt and wife conducted to pharm and main lobby in wheelchair by transport   no hematuria/no dysuria

## 2024-05-07 NOTE — H&P PST ADULT - PROBLEM SELECTOR PLAN 1
Labs-CBC, BMP, , EKG   Medical clearance scheduled prior to PST visit    Preop Hibiclens x 1 day instructions reviewed and given.   Take routine meds DOS with small sips of water, avoid NSAIDs and OTC supplements,   Anesthesiologist to review PST labs, EKG, required clearances, and optimization for surgery

## 2024-05-07 NOTE — H&P PST ADULT - ASSESSMENT
49F HTN, HLD presents to PST for scheduled right carpal tunnel release on 24 with Dr.Wu CAPRINI SCORE    AGE RELATED RISK FACTORS                                                             [ x] Age 41-60 years                                            (1 Point)  [ ] Age: 61-74 years                                           (2 Points)                 [ ] Age= 75 years                                                (3 Points)             DISEASE RELATED RISK FACTORS                                                       [ ] Edema in the lower extremities                 (1 Point)                     [ ] Varicose veins                                               (1 Point)                                 [ ] BMI > 25 Kg/m2                                            (1 Point)                                  [ ] Serious infection (ie PNA)                            (1 Point)                     [ ] Lung disease ( COPD, Emphysema)            (1 Point)                                                                          [ ] Acute myocardial infarction                         (1 Point)                  [ ] Congestive heart failure (in the previous month)  (1 Point)         [ ] Inflammatory bowel disease                            (1 Point)                  [ ] Central venous access, PICC or Port               (2 points)       (within the last month)                                                                [ ] Stroke (in the previous month)                        (5 Points)    [ ] Previous or present malignancy                       (2 points)                                                                                                                                                         HEMATOLOGY RELATED FACTORS                                                         [ ] Prior episodes of VTE                                     (3 Points)                     [ ] Positive family history for VTE                      (3 Points)                  [ ] Prothrombin 23158 A                                     (3 Points)                     [ ] Factor V Leiden                                                (3 Points)                        [ ] Lupus anticoagulants                                      (3 Points)                                                           [ ] Anticardiolipin antibodies                              (3 Points)                                                       [ ] High homocysteine in the blood                   (3 Points)                                             [ ] Other congenital or acquired thrombophilia      (3 Points)                                                [ ] Heparin induced thrombocytopenia                  (3 Points)                                        MOBILITY RELATED FACTORS  [ ] Bed rest                                                         (1 Point)  [ ] Plaster cast                                                    (2 points)  [ ] Bed bound for more than 72 hours           (2 Points)    GENDER SPECIFIC FACTORS  [ ] Pregnancy or had a baby within the last month   (1 Point)  [ ] Post-partum < 6 weeks                                   (1 Point)  [ ] Hormonal therapy  or oral contraception   (1 Point)  [ ] History of pregnancy complications              (1 point)  [ ] Unexplained or recurrent              (1 Point)    OTHER RISK FACTORS                                           (1 Point)  [ ] BMI >40, smoking, diabetes requiring insulin, chemotherapy  blood transfusions and length of surgery over 2 hours    SURGERY RELATED RISK FACTORS  [ ]  Section within the last month     (1 Point)  [ ] Minor surgery                                                  (1 Point)  [ ] Arthroscopic surgery                                       (2 Points)  [ x] Planned major surgery lasting more            (2 Points)      than 45 minutes     [ ] Elective hip or knee joint replacement       (5 points)       surgery                                                TRAUMA RELATED RISK FACTORS  [ ] Fracture of the hip, pelvis, or leg                       (5 Points)  [ ] Spinal cord injury resulting in paralysis             (5 points)       (in the previous month)    [ ] Paralysis  (less than 1 month)                             (5 Points)  [ ] Multiple Trauma within 1 month                        (5 Points)    Total Score [    3    ]    Caprini Score 0-2: Low Risk, NO VTE prophylaxis required for most patients, encourage ambulation  Caprini Score 3-6: Moderate Risk , pharmacologic VTE prophylaxis is indicated for most patients (in the absence of contraindications)  Caprini Score Greater than or =7: High risk, pharmocologic VTE prophylaxis indicated for most patients (in the absence of contraindications)

## 2024-05-17 ENCOUNTER — APPOINTMENT (OUTPATIENT)
Dept: ORTHOPEDIC SURGERY | Facility: HOSPITAL | Age: 49
End: 2024-05-17
Payer: COMMERCIAL

## 2024-05-17 ENCOUNTER — TRANSCRIPTION ENCOUNTER (OUTPATIENT)
Age: 49
End: 2024-05-17

## 2024-05-17 ENCOUNTER — NON-APPOINTMENT (OUTPATIENT)
Age: 49
End: 2024-05-17

## 2024-05-17 ENCOUNTER — OUTPATIENT (OUTPATIENT)
Dept: OUTPATIENT SERVICES | Facility: HOSPITAL | Age: 49
LOS: 1 days | Discharge: HOME | End: 2024-05-17

## 2024-05-17 VITALS — DIASTOLIC BLOOD PRESSURE: 72 MMHG | HEART RATE: 73 BPM | RESPIRATION RATE: 15 BRPM | SYSTOLIC BLOOD PRESSURE: 132 MMHG

## 2024-05-17 VITALS
RESPIRATION RATE: 16 BRPM | HEIGHT: 57 IN | WEIGHT: 121.92 LBS | OXYGEN SATURATION: 99 % | TEMPERATURE: 99 F | DIASTOLIC BLOOD PRESSURE: 88 MMHG | SYSTOLIC BLOOD PRESSURE: 150 MMHG | HEART RATE: 79 BPM

## 2024-05-17 DIAGNOSIS — I51.89 OTHER ILL-DEFINED HEART DISEASES: Chronic | ICD-10-CM

## 2024-05-17 DIAGNOSIS — Z98.890 OTHER SPECIFIED POSTPROCEDURAL STATES: Chronic | ICD-10-CM

## 2024-05-17 LAB
HCG UR QL: NEGATIVE — SIGNIFICANT CHANGE UP
POTASSIUM SERPL-MCNC: 3.3 MMOL/L — LOW (ref 3.5–5.3)
POTASSIUM SERPL-SCNC: 3.3 MMOL/L — LOW (ref 3.5–5.3)

## 2024-05-17 PROCEDURE — 64721 CARPAL TUNNEL SURGERY: CPT | Mod: RT

## 2024-05-17 RX ORDER — AMLODIPINE BESYLATE 2.5 MG/1
1 TABLET ORAL
Refills: 0 | DISCHARGE

## 2024-05-17 RX ORDER — ACETAMINOPHEN 500 MG
1000 TABLET ORAL ONCE
Refills: 0 | Status: DISCONTINUED | OUTPATIENT
Start: 2024-05-17 | End: 2024-05-23

## 2024-05-17 RX ORDER — SODIUM CHLORIDE 9 MG/ML
1000 INJECTION, SOLUTION INTRAVENOUS
Refills: 0 | Status: DISCONTINUED | OUTPATIENT
Start: 2024-05-17 | End: 2024-05-23

## 2024-05-17 RX ORDER — SODIUM CHLORIDE 9 MG/ML
3 INJECTION INTRAMUSCULAR; INTRAVENOUS; SUBCUTANEOUS EVERY 8 HOURS
Refills: 0 | Status: DISCONTINUED | OUTPATIENT
Start: 2024-05-17 | End: 2024-05-17

## 2024-05-17 RX ORDER — FENOFIBRATE,MICRONIZED 130 MG
1 CAPSULE ORAL
Refills: 0 | DISCHARGE

## 2024-05-17 NOTE — ASU PATIENT PROFILE, ADULT - PRO ARRIVE FROM
Subjective   Patient ID: Joyce is a 25 year old       who presents today for prenatal visit.  She is of 23w2d gestation, EDC 9/15/2019    HPI:   yellow discharge, no itch or bleeding or odor  Last pap smear last  in [facility] - normal per patient  Just came back from ultrasound  Needs order for f/u views and growth  Just feeling really tired all the time    Assessment/Plan  Problem List Items Addressed This Visit        Digestive    BMI 40.0-44.9, adult (CMS/[facility])     3#10oz wt gain so far this pregancy which is appropriate  Will cont to monitor            Other    Supervision of normal first pregnancy, antepartum     Order provided for f/u ultraosund  Did not have afp done  1 ht gtt/ cbc to be done next week  Referred to [facility] for P/n classes             Relevant Orders    POCT URINALYSIS DIPSTICK (Completed)    GLUCOSE, GESTATIONAL SCREEN (50G)-140 CUTOFF    CBC NO DIFFERENTIAL    US OB FOLLOW UP SINGLE GESTATION          Return in about 1 month (around 2019) for obstetric check.      
home

## 2024-05-17 NOTE — ASU DISCHARGE PLAN (ADULT/PEDIATRIC) - NS MD DC FALL RISK RISK
For information on Fall & Injury Prevention, visit: [email or web address] OR  [email or web address] OR  https://www.cdc.gov/steadi/patient.html

## 2024-05-22 ENCOUNTER — APPOINTMENT (OUTPATIENT)
Dept: ORTHOPEDIC SURGERY | Facility: CLINIC | Age: 49
End: 2024-05-22
Payer: COMMERCIAL

## 2024-05-22 DIAGNOSIS — M25.831 OTHER SPECIFIED JOINT DISORDERS, RIGHT WRIST: ICD-10-CM

## 2024-05-22 PROCEDURE — 99024 POSTOP FOLLOW-UP VISIT: CPT

## 2024-05-22 NOTE — PHYSICAL EXAM
[de-identified] : RIGHT HAND incision clean, dry, and intact s/p CTR. no erythema or drainage. mild diffuse swelling of hand. bruising of palm. wrist ROM: limited extension, flexion. good EPL, FPL. good finger extension, flex almost to mid-palm. good finger abduction and adduction.  SILT to median, ulnar, radial distributions. palpable radial pulse, brisk cap refill all digits. no triggering.

## 2024-05-22 NOTE — ASSESSMENT
[FreeTextEntry1] : - ok to wash incision with soap and water. do not scrub or soak incision for 2 weeks. do not apply ointment/lotion to incision for 2 weeks. - prescribed OT. demonstrated HEP. - light activity, advance as tolerated. - Work: continue OOW. anticipate 6 weeks post-operatively.   F/u 3 weeks. Plan to address LEFT side after RIGHT side has recovered.

## 2024-05-22 NOTE — HISTORY OF PRESENT ILLNESS
[] : Post Surgical Visit: yes [de-identified] : 5/22/24: HPI obtained with  ([place]) 5 days s/p RIGHT CTR 5/17/24. c/o pain and numbness over dorsal MPJ when making a fist. denies numbness/tingling into fingertips. also c/o pain and numbness of upper arm. denies f/c or ill sx.  5/1/24: presents with daughter - assisting with HPI and translation ([place]) - f/u EDX for bilateral CTS. - R ulnar abutment.  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 - R median: DML 4.2 ms, DSL 4.1 ms. - L ulnar: 67 m/s above to below elbow, 69 m/s below elbow to wrist. - R ulnar: 67 m/s above to below elbow, 71 m/s below elbow to wrist.  3/7/24: 50yo RHD female () presents for RIGHT wrist pain and BILATERAL middle/ring/small finger numbness. Reports swelling of RIGHT dorsal hand 2 months ago. Numbness is intermittent. Reports that she tried wearing her daughter's wrist brace, but it made the numbness worse.  PMH: GERD. HTN. HLD. Uterine fibroma. PSH: lumbar discectomy. NKDA. [FreeTextEntry1] : Right Hand [FreeTextEntry5] : 05/22/2024: CHRISTIANA MASON [RHD] is here for first PO visit of right carpal tunnel release. Patient she has pain across the whole arm since surgery. Patient continues to experience n/t around the knuckles and wrist area. [de-identified] : 05/17/2024 [de-identified] : Right Carpal Tunnel Release

## 2024-05-23 DIAGNOSIS — J45.909 UNSPECIFIED ASTHMA, UNCOMPLICATED: ICD-10-CM

## 2024-05-23 DIAGNOSIS — G56.01 CARPAL TUNNEL SYNDROME, RIGHT UPPER LIMB: ICD-10-CM

## 2024-05-23 DIAGNOSIS — I10 ESSENTIAL (PRIMARY) HYPERTENSION: ICD-10-CM

## 2024-05-23 DIAGNOSIS — K21.9 GASTRO-ESOPHAGEAL REFLUX DISEASE WITHOUT ESOPHAGITIS: ICD-10-CM

## 2024-05-23 DIAGNOSIS — F32.A DEPRESSION, UNSPECIFIED: ICD-10-CM

## 2024-05-23 DIAGNOSIS — E78.5 HYPERLIPIDEMIA, UNSPECIFIED: ICD-10-CM

## 2024-06-12 ENCOUNTER — APPOINTMENT (OUTPATIENT)
Dept: ORTHOPEDIC SURGERY | Facility: CLINIC | Age: 49
End: 2024-06-12
Payer: COMMERCIAL

## 2024-06-12 PROCEDURE — 99024 POSTOP FOLLOW-UP VISIT: CPT

## 2024-06-12 NOTE — ASSESSMENT
[FreeTextEntry1] : - continue OT. continue HEP, add scar massage. - advance activity as tolerated. - Work: continue OOW. will re-evaluate at next visit.   F/u 3 weeks. Plan to address LEFT side after RIGHT side has recovered.

## 2024-06-12 NOTE — PHYSICAL EXAM
[de-identified] : RIGHT HAND well healed scar s/p CTR. wrist ROM: good extension, flexion. good EPL, FPL. good finger extension, flex to full fist. good finger abduction and adduction.  SILT to median, ulnar, radial distributions. palpable radial pulse, brisk cap refill all digits. no triggering.

## 2024-06-12 NOTE — HISTORY OF PRESENT ILLNESS
[] : Post Surgical Visit: yes [de-identified] : 6/12/24: presents with daughter - assisting with HPI and translation (Bhutanese) - 3.5 weeks s/p RIGHT CTR 5/17/24. c/o pain over thenar and hypothenar muscles with gripping/lifting. reports numbness is improving, now localized to 2nd/3rd MPJ. OT 2x/wk @[facility].  5/22/24: HPI obtained with  (Bhutanese) 5 days s/p RIGHT CTR 5/17/24. c/o pain and numbness over dorsal MPJ when making a fist. denies numbness/tingling into fingertips. also c/o pain and numbness of upper arm. denies f/c or ill sx.  5/1/24: presents with daughter - assisting with HPI and translation (Bhutanese) - f/u EDX for bilateral CTS. - R ulnar abutment.  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 - R median: DML 4.2 ms, DSL 4.1 ms. - L ulnar: 67 m/s above to below elbow, 69 m/s below elbow to wrist. - R ulnar: 67 m/s above to below elbow, 71 m/s below elbow to wrist.  3/7/24: 48yo RHD female () presents for RIGHT wrist pain and BILATERAL middle/ring/small finger numbness. Reports swelling of RIGHT dorsal hand 2 months ago. Numbness is intermittent. Reports that she tried wearing her daughter's wrist brace, but it made the numbness worse.  PMH: GERD. HTN. HLD. Uterine fibroma. PSH: lumbar discectomy. NKDA. [FreeTextEntry1] : Right Hand [FreeTextEntry5] : CHRISTIANA is here today for post op visit #2. pt states since last visit, pain increased at 1st and 5th metacarpal area of palm of hand. attending therapy 2x/week.  [de-identified] : 05/17/2024 [de-identified] : Right Carpal Tunnel Release

## 2024-07-02 ENCOUNTER — APPOINTMENT (OUTPATIENT)
Dept: ORTHOPEDIC SURGERY | Facility: CLINIC | Age: 49
End: 2024-07-02
Payer: COMMERCIAL

## 2024-07-02 DIAGNOSIS — G56.01 CARPAL TUNNEL SYNDROME, RIGHT UPPER LIMB: ICD-10-CM

## 2024-07-02 PROCEDURE — 99024 POSTOP FOLLOW-UP VISIT: CPT

## 2024-07-08 ENCOUNTER — APPOINTMENT (OUTPATIENT)
Dept: MAMMOGRAPHY | Facility: CLINIC | Age: 49
End: 2024-07-08

## 2024-07-08 ENCOUNTER — APPOINTMENT (OUTPATIENT)
Dept: ULTRASOUND IMAGING | Facility: CLINIC | Age: 49
End: 2024-07-08

## 2024-08-08 ENCOUNTER — APPOINTMENT (OUTPATIENT)
Dept: ULTRASOUND IMAGING | Facility: CLINIC | Age: 49
End: 2024-08-08

## 2024-08-08 ENCOUNTER — RESULT REVIEW (OUTPATIENT)
Age: 49
End: 2024-08-08

## 2024-08-08 ENCOUNTER — APPOINTMENT (OUTPATIENT)
Dept: MAMMOGRAPHY | Facility: CLINIC | Age: 49
End: 2024-08-08

## 2024-08-08 PROCEDURE — 77061 BREAST TOMOSYNTHESIS UNI: CPT

## 2024-08-08 PROCEDURE — 77065 DX MAMMO INCL CAD UNI: CPT | Mod: RT

## 2024-08-08 PROCEDURE — 76642 ULTRASOUND BREAST LIMITED: CPT | Mod: RT

## 2024-08-12 ENCOUNTER — NON-APPOINTMENT (OUTPATIENT)
Age: 49
End: 2024-08-12

## 2024-08-22 ENCOUNTER — APPOINTMENT (OUTPATIENT)
Dept: ORTHOPEDIC SURGERY | Facility: CLINIC | Age: 49
End: 2024-08-22
Payer: COMMERCIAL

## 2024-08-22 DIAGNOSIS — G56.01 CARPAL TUNNEL SYNDROME, RIGHT UPPER LIMB: ICD-10-CM

## 2024-08-22 PROCEDURE — 99213 OFFICE O/P EST LOW 20 MIN: CPT

## 2024-08-22 NOTE — PHYSICAL EXAM
[de-identified] : RIGHT HAND well healed scar s/p CTR. mild TTP radial to scar. wrist ROM: good extension, flexion. good EPL, FPL. good finger extension, flex to full fist. good finger abduction and adduction.  SILT to median, ulnar, radial distributions. palpable radial pulse, brisk cap refill all digits. no triggering.

## 2024-08-22 NOTE — HISTORY OF PRESENT ILLNESS
[] : Post Surgical Visit: yes [de-identified] : 8/22/24: HPI obtained with  ([place]). - 3 months s/p RIGHT CTR 5/17/24. c/o thenar and ulnar hand pain with heavy lifting. numbness has resolved. Reports that OT stopped ~2 weeks ago, needs a new Rx.  7/2/24: presents with  - assisting with HPI and translation ([place]ian) - 6.5 weeks s/p RIGHT CTR 5/17/24. c/o hypothenar pain with gripping. numbness is improving, still over 2nd/3rd MPJ. OT 2x/wk.  6/12/24: presents with daughter - assisting with HPI and translation ([place]) - 3.5 weeks s/p RIGHT CTR 5/17/24. c/o pain over thenar and hypothenar muscles with gripping/lifting. reports numbness is improving, now localized to 2nd/3rd MPJ. OT 2x/wk @[facility].  5/22/24: HPI obtained with  ([place]) 5 days s/p RIGHT CTR 5/17/24. c/o pain and numbness over dorsal MPJ when making a fist. denies numbness/tingling into fingertips. also c/o pain and numbness of upper arm. denies f/c or ill sx.  5/1/24: presents with daughter - assisting with HPI and translation ([place]ian) - f/u EDX for bilateral CTS. - R ulnar abutment.  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 - R median: DML 4.2 ms, DSL 4.1 ms. - L ulnar: 67 m/s above to below elbow, 69 m/s below elbow to wrist. - R ulnar: 67 m/s above to below elbow, 71 m/s below elbow to wrist.  3/7/24: 50yo RHD female () presents for RIGHT wrist pain and BILATERAL middle/ring/small finger numbness. Reports swelling of RIGHT dorsal hand 2 months ago. Numbness is intermittent. Reports that she tried wearing her daughter's wrist brace, but it made the numbness worse.  PMH: GERD. HTN. HLD. Uterine fibroma. PSH: lumbar discectomy. NKDA. [FreeTextEntry5] : CHRISTIANA is here today for RIGHT hand post op visit #4. pt states pain is still persistent. no longer attending therapy due to pending insurance approval. also c/o LEFT middle, ring and small finger numbness.  [de-identified] : therapy  [de-identified] : 05/17/2024 [de-identified] : Right Carpal Tunnel Release

## 2024-08-22 NOTE — ASSESSMENT
[FreeTextEntry1] : - renewed OT. - activity as tolerated. - Work: she is currently OOW. ok to return to work on 8/26/24 - light duty: no lifting >20lbs, no mopping or sweeping.   F/u 4 weeks. Plan to address LEFT side after RIGHT side has recovered.

## 2024-08-26 ENCOUNTER — APPOINTMENT (OUTPATIENT)
Dept: BREAST CENTER | Facility: CLINIC | Age: 49
End: 2024-08-26
Payer: COMMERCIAL

## 2024-08-26 VITALS
OXYGEN SATURATION: 99 % | HEART RATE: 80 BPM | BODY MASS INDEX: 28.69 KG/M2 | TEMPERATURE: 98.1 F | SYSTOLIC BLOOD PRESSURE: 136 MMHG | DIASTOLIC BLOOD PRESSURE: 86 MMHG | WEIGHT: 133 LBS | HEIGHT: 57 IN

## 2024-08-26 DIAGNOSIS — Z78.9 OTHER SPECIFIED HEALTH STATUS: ICD-10-CM

## 2024-08-26 DIAGNOSIS — R92.30 DENSE BREASTS, UNSPECIFIED: ICD-10-CM

## 2024-08-26 PROCEDURE — 99213 OFFICE O/P EST LOW 20 MIN: CPT

## 2024-08-26 NOTE — CONSULT LETTER
[Dear  ___] : Dear  [unfilled], [Courtesy Letter:] : I had the pleasure of seeing your patient, [unfilled], in my office today. [Please see my note below.] : Please see my note below. [Consult Closing:] : Thank you very much for allowing me to participate in the care of this patient.  If you have any questions, please do not hesitate to contact me. [Sincerely,] : Sincerely, [FreeTextEntry3] : Sienna De Leon MD [DrEnrique  ___] : Dr. DUMONT

## 2024-08-26 NOTE — PHYSICAL EXAM
[Normocephalic] : normocephalic [Atraumatic] : atraumatic [Supple] : supple [No Supraclavicular Adenopathy] : no supraclavicular adenopathy [No Thyromegaly] : no thyromegaly [Examined in the supine and seated position] : examined in the supine and seated position [Symmetrical] : symmetrical [Bra Size: ___] : Bra Size: [unfilled] [No dominant masses] : no dominant masses in right breast  [No dominant masses] : no dominant masses left breast [No Nipple Retraction] : no left nipple retraction [No Nipple Discharge] : no left nipple discharge [No Axillary Lymphadenopathy] : no left axillary lymphadenopathy [No Edema] : no edema [No Swelling] : no swelling [Full ROM] : full range of motion [No Rashes] : no rashes [No Ulceration] : no ulceration [TextEntry] : Psych: Appropriate, NAD, A&Ox3 Neuro: Intact grossly, gait normal

## 2024-08-26 NOTE — DATA REVIEWED
Called pt back, no answer.   Left message with .    [FreeTextEntry1] : 1/5/24 [place], bilat dMMG and US: TC 17.8%. Extremely dense. There is an asymmetry in the right outer breast, posterior depth, best seen on the full CC and spot compression CC view. No suspicious mass, architectural distortion or microcalcifications are identified in the left breast. US: R- No susp solid mass. There is a complicated cyst in the 10:00 axis, 6cmfn, measuring 0.6 x 0.6 x 0.3 cm. This finding may correspond with mammographic finding described above and is probably benign, short interval f/u is rec. L- No suspicious solid mass. There is a circumscribed hypoechoic mass in the 12:00 axis, 1cmfn measuring 0.8 x 0.7 x 0.5 cm, stable since March 2022. There are benign-appearing cysts in the left breast, measuring up to 0.5 cm. Impression: Probably benign right breast asymmetry, likely corresponding with complicated cyst. Right dMMG and targeted right breast US in 6 mos is rec to demonstrate stability. Stable probably benign left breast mass. Targeted left breast US in 1yr is rec to demonstrate further two-year stability. BR3.  8/8/24 [place], bilat dMMG and R limited US: Extremely dense. Previously noted probably benign posterior outer right breast asymmetry is less conspicuous on today's imaging. Continued f/u is advised. Initially noted addl asymmetry in the posterior Central right breast seen on CC slice 8/23 favors overlapping FG tissue on supplemental images. No new susp mass, microcals or other sign of malignancy is identified.   R limited US: 10:00, 6N stable probably benign circumscribed hypoechoic mass likely complicated cyst measuring 0.6 x 0.6 x 0.3 cm, without significant change compared to 1/5/2024. Continued f/u with targeted US in 6 mos. Impression: Probably benign outer right breast asymmetry appears less conspicuous compared to prior. Continued f/u with dMMG in 6mos at the time of patient's annual mmg to ascertain 1 year stability. Stable probably benign right breast 10:00, 6Nhypoechoic mass likely complicated cyst. Continued f/u with targeted US in 6mos at the time of patient's annual mmg to ascertain 1 year stability. Please note at the same time patient will be due for f/u of US seen probably benign left breast finding as per report dated 1/5/2024. REC: dMMG and US in 6 months. BR3

## 2024-08-26 NOTE — HISTORY OF PRESENT ILLNESS
[FreeTextEntry1] : Referred by Richie Thomas DO PCP Dr. Keith Leventhal  Pt is a 49 year old Upper sorbian speaking female here today for f/u after recent studies.  Here with her , Mauricio, requests that he translate.  Pt denies any breast lesions, discharge or masses. Has occasional right mastalgia, she is perimenopausal.   9/12/22 [facility], Bilat sMMG: Het dense. L- 6:00 there is a small group of microcalcs which appear to be linearly oriented. L dMMG recommended. Additional scattered benign calc are noted. R- superior, in the lateral aspect 3cm behind the nipple there is a question of architectural distortion. R dMMG and targeted US rec. BR0  9/27/22 [facility], bilat dMMG and US: Het dense. R- previously questioned possible area of architectural distortion in the upper R breast effaces on spot compression. **L- cluster of coarse het calcs is seen in the lower central L breast 6:00 4cmfn, these cals are indeterminant. Further eval with stereotactic bx is rec. US: R- 10:00 2cmfn is a deeply located 0.9 x 0.4 x 1.0cm cyst. No susp finding seen in R breast or axilla. L- 12:00 1cmfn 0.8 x 0.5 x 0.8cm circumscribed solid oval hypoechoic mass. *This is probably benign finding and 6 mos f/u US is rec. 12:00 1cmfn 0.3 x 0.3 x 0.3 cm cyst. 8:00 6cmfn 0.5cm cyst. 9:00 9cmfn 0.6cm cyst. Impression: L stereotactic bx is rec 6:00. L 6 mos f/u US for 12:00 0.8cm mass. Right no evidence of malignancy. BR4  10/20/22 [facility], L stereotactic bx. Path: Proliferative and nonproliferative breast changes without atypia characterized by usual type ductal hyperplasia, sclerosing adenosis, columnar cell change, cysts, apocrine metaplasia, pseudoangiomatous stromal hyperplasia and dense stromal fibrosis. Calcs are associated with benign breast tissue. Results are Benign and Concordant.  3/28/23 [place], L dMMG and US: Extremely dense. Bx clip within residual calcs in the region of previous benign stereotactic bx in the L lower central breast. No susp mass, microcals or other sign of malignancy is identified. US: Circumscribed hypoechoic mass 12:00 1cmfn measuring 0.8x0.5x0.8cm, stable since 9/2022. This finding is probably benign, continues short interval f/u is recommended. Impression: MMG negative. Stable probably benign L breast mass. Targeted L US at time of annual mmg is rec. BR3  1/5/24 [place] d[facility] and US: TC 17.8%. Extremely dense. There is an asymmetry in the right outer breast, posterior depth, best seen on the full CC and spot compression CC view. No suspicious mass, architectural distortion or microcalcifications are identified in the left breast. US: R- No susp solid mass. There is a complicated cyst in the 10:00 axis, 6cmfn, measuring 0.6 x 0.6 x 0.3 cm. This finding may correspond with mammographic finding described above and is probably benign, short interval f/u is rec. L- No suspicious solid mass. There is a circumscribed hypoechoic mass in the 12:00 axis, 1cmfn measuring 0.8 x 0.7 x 0.5 cm, stable since March 2022. There are benign-appearing cysts in the left breast, measuring up to 0.5 cm. Impression: Probably benign right breast asymmetry, likely corresponding with complicated cyst. Right dMMG and targeted right breast US in 6 mos is rec to demonstrate stability. Stable probably benign left breast mass. Targeted left breast US in 1yr is rec to demonstrate further two-year stability. BR3.  8/8/24 [facility] dMMG and R limited US: Extremely dense. Previously noted probably benign posterior outer right breast asymmetry is less conspicuous on today's imaging. Continued f/u is advised. Initially noted addl asymmetry in the posterior Central right breast seen on CC slice 8/23 favors overlapping FG tissue on supplemental images. No new susp mass, microcals or other sign of malignancy is identified.   R limited US: 10:00, 6N stable probably benign circumscribed hypoechoic mass likely complicated cyst measuring 0.6 x 0.6 x 0.3 cm, without significant change compared to 1/5/2024. Continued f/u with targeted US in 6 mos. Impression: Probably benign outer right breast asymmetry appears less conspicuous compared to prior. Continued f/u with dMMG in 6mos at the time of patient's annual mmg to ascertain 1 year stability. Stable probably benign right breast 10:00, 6Nhypoechoic mass likely complicated cyst. Continued f/u with targeted US in 6mos at the time of patient's annual mmg to ascertain 1 year stability. Please note at the same time patient will be due for f/u of US seen probably benign left breast finding as per report dated 1/5/2024. REC: dMMG and US in 6 months. BR3  Fhx: None for cancer. Not AJ  CBE: FCC R>L, No axillary or SC lymphadenopathy.  Recommend Ibuprofen as needed for mastalgia, decrease coffee consumption also recommended. Due for sMMG and and US including L targeted 9/23. Patient lives in [place], made her aware we are happy to continue to see her but given her long commute to our office, contact for Dr. Silvia Reyes also provided.        Plan  F/u after bilat mmg and u/s 2/25 with NP. Rec NSAIDs and decrease caffeine intake for cyclical mastalgia. PT may consider transfer of care closer to home, contact of Dr. Sylvia Reyes given.

## 2024-08-26 NOTE — HISTORY OF PRESENT ILLNESS
[FreeTextEntry1] : Referred by Richie Thomas DO PCP Dr. Keith Leventhal  Pt is a 49 year old Chinese speaking female here today for f/u after recent studies.  Here with her , Mauricio, requests that he translate.  Pt denies any breast lesions, discharge or masses. Has occasional right mastalgia, she is perimenopausal.   9/12/22 [facility], Bilat sMMG: Het dense. L- 6:00 there is a small group of microcalcs which appear to be linearly oriented. L dMMG recommended. Additional scattered benign calc are noted. R- superior, in the lateral aspect 3cm behind the nipple there is a question of architectural distortion. R dMMG and targeted US rec. BR0  9/27/22 [facility], bilat dMMG and US: Het dense. R- previously questioned possible area of architectural distortion in the upper R breast effaces on spot compression. **L- cluster of coarse het calcs is seen in the lower central L breast 6:00 4cmfn, these cals are indeterminant. Further eval with stereotactic bx is rec. US: R- 10:00 2cmfn is a deeply located 0.9 x 0.4 x 1.0cm cyst. No susp finding seen in R breast or axilla. L- 12:00 1cmfn 0.8 x 0.5 x 0.8cm circumscribed solid oval hypoechoic mass. *This is probably benign finding and 6 mos f/u US is rec. 12:00 1cmfn 0.3 x 0.3 x 0.3 cm cyst. 8:00 6cmfn 0.5cm cyst. 9:00 9cmfn 0.6cm cyst. Impression: L stereotactic bx is rec 6:00. L 6 mos f/u US for 12:00 0.8cm mass. Right no evidence of malignancy. BR4  10/20/22 [facility], L stereotactic bx. Path: Proliferative and nonproliferative breast changes without atypia characterized by usual type ductal hyperplasia, sclerosing adenosis, columnar cell change, cysts, apocrine metaplasia, pseudoangiomatous stromal hyperplasia and dense stromal fibrosis. Calcs are associated with benign breast tissue. Results are Benign and Concordant.  3/28/23 [place], L dMMG and US: Extremely dense. Bx clip within residual calcs in the region of previous benign stereotactic bx in the L lower central breast. No susp mass, microcals or other sign of malignancy is identified. US: Circumscribed hypoechoic mass 12:00 1cmfn measuring 0.8x0.5x0.8cm, stable since 9/2022. This finding is probably benign, continues short interval f/u is recommended. Impression: MMG negative. Stable probably benign L breast mass. Targeted L US at time of annual mmg is rec. BR3  1/5/24 [place] d[facility] and US: TC 17.8%. Extremely dense. There is an asymmetry in the right outer breast, posterior depth, best seen on the full CC and spot compression CC view. No suspicious mass, architectural distortion or microcalcifications are identified in the left breast. US: R- No susp solid mass. There is a complicated cyst in the 10:00 axis, 6cmfn, measuring 0.6 x 0.6 x 0.3 cm. This finding may correspond with mammographic finding described above and is probably benign, short interval f/u is rec. L- No suspicious solid mass. There is a circumscribed hypoechoic mass in the 12:00 axis, 1cmfn measuring 0.8 x 0.7 x 0.5 cm, stable since March 2022. There are benign-appearing cysts in the left breast, measuring up to 0.5 cm. Impression: Probably benign right breast asymmetry, likely corresponding with complicated cyst. Right dMMG and targeted right breast US in 6 mos is rec to demonstrate stability. Stable probably benign left breast mass. Targeted left breast US in 1yr is rec to demonstrate further two-year stability. BR3.  8/8/24 [facility] dMMG and R limited US: Extremely dense. Previously noted probably benign posterior outer right breast asymmetry is less conspicuous on today's imaging. Continued f/u is advised. Initially noted addl asymmetry in the posterior Central right breast seen on CC slice 8/23 favors overlapping FG tissue on supplemental images. No new susp mass, microcals or other sign of malignancy is identified.   R limited US: 10:00, 6N stable probably benign circumscribed hypoechoic mass likely complicated cyst measuring 0.6 x 0.6 x 0.3 cm, without significant change compared to 1/5/2024. Continued f/u with targeted US in 6 mos. Impression: Probably benign outer right breast asymmetry appears less conspicuous compared to prior. Continued f/u with dMMG in 6mos at the time of patient's annual mmg to ascertain 1 year stability. Stable probably benign right breast 10:00, 6Nhypoechoic mass likely complicated cyst. Continued f/u with targeted US in 6mos at the time of patient's annual mmg to ascertain 1 year stability. Please note at the same time patient will be due for f/u of US seen probably benign left breast finding as per report dated 1/5/2024. REC: dMMG and US in 6 months. BR3  Fhx: None for cancer. Not AJ  CBE: FCC R>L, No axillary or SC lymphadenopathy.  Recommend Ibuprofen as needed for mastalgia, decrease coffee consumption also recommended. Due for sMMG and and US including L targeted 9/23. Patient lives in [place], made her aware we are happy to continue to see her but given her long commute to our office, contact for Dr. Silvia Reyes also provided.        Plan  F/u after bilat mmg and u/s 2/25 with NP. Rec NSAIDs and decrease caffeine intake for cyclical mastalgia. PT may consider transfer of care closer to home, contact of Dr. Sylvia Reyes given.

## 2024-08-26 NOTE — ASSESSMENT
[FreeTextEntry1] : Referred by Richie Thomas DO PCP Dr. Keith Leventhal  Pt is a 49 year old Danish speaking female here today for f/u after recent studies.  Here with her , Mauricio, requests that he translate.  Pt denies any breast lesions, discharge or masses. Has occasional right mastalgia, she is perimenopausal.   9/12/22 [facility], Bilat sMMG: Het dense. L- 6:00 there is a small group of microcalcs which appear to be linearly oriented. L dMMG recommended. Additional scattered benign calc are noted. R- superior, in the lateral aspect 3cm behind the nipple there is a question of architectural distortion. R dMMG and targeted US rec. BR0  9/27/22 [facility], bilat dMMG and US: Het dense. R- previously questioned possible area of architectural distortion in the upper R breast effaces on spot compression. **L- cluster of coarse het calcs is seen in the lower central L breast 6:00 4cmfn, these cals are indeterminant. Further eval with stereotactic bx is rec. US: R- 10:00 2cmfn is a deeply located 0.9 x 0.4 x 1.0cm cyst. No susp finding seen in R breast or axilla. L- 12:00 1cmfn 0.8 x 0.5 x 0.8cm circumscribed solid oval hypoechoic mass. *This is probably benign finding and 6 mos f/u US is rec. 12:00 1cmfn 0.3 x 0.3 x 0.3 cm cyst. 8:00 6cmfn 0.5cm cyst. 9:00 9cmfn 0.6cm cyst. Impression: L stereotactic bx is rec 6:00. L 6 mos f/u US for 12:00 0.8cm mass. Right no evidence of malignancy. BR4  10/20/22 [facility], L stereotactic bx. Path: Proliferative and nonproliferative breast changes without atypia characterized by usual type ductal hyperplasia, sclerosing adenosis, columnar cell change, cysts, apocrine metaplasia, pseudoangiomatous stromal hyperplasia and dense stromal fibrosis. Calcs are associated with benign breast tissue. Results are Benign and Concordant.  3/28/23 [place], L dMMG and US: Extremely dense. Bx clip within residual calcs in the region of previous benign stereotactic bx in the L lower central breast. No susp mass, microcals or other sign of malignancy is identified. US: Circumscribed hypoechoic mass 12:00 1cmfn measuring 0.8x0.5x0.8cm, stable since 9/2022. This finding is probably benign, continues short interval f/u is recommended. Impression: MMG negative. Stable probably benign L breast mass. Targeted L US at time of annual mmg is rec. BR3  1/5/24 [facility] dMMG and US: TC 17.8%. Extremely dense. There is an asymmetry in the right outer breast, posterior depth, best seen on the full CC and spot compression CC view. No suspicious mass, architectural distortion or microcalcifications are identified in the left breast. US: R- No susp solid mass. There is a complicated cyst in the 10:00 axis, 6cmfn, measuring 0.6 x 0.6 x 0.3 cm. This finding may correspond with mammographic finding described above and is probably benign, short interval f/u is rec. L- No suspicious solid mass. There is a circumscribed hypoechoic mass in the 12:00 axis, 1cmfn measuring 0.8 x 0.7 x 0.5 cm, stable since March 2022. There are benign-appearing cysts in the left breast, measuring up to 0.5 cm. Impression: Probably benign right breast asymmetry, likely corresponding with complicated cyst. Right dMMG and targeted right breast US in 6 mos is rec to demonstrate stability. Stable probably benign left breast mass. Targeted left breast US in 1yr is rec to demonstrate further two-year stability. BR3.  8/8/24 [facility] dMMG and R limited US: Extremely dense. Previously noted probably benign posterior outer right breast asymmetry is less conspicuous on today's imaging. Continued f/u is advised. Initially noted addl asymmetry in the posterior Central right breast seen on CC slice 8/23 favors overlapping FG tissue on supplemental images. No new susp mass, microcals or other sign of malignancy is identified.   R limited US: 10:00, 6N stable probably benign circumscribed hypoechoic mass likely complicated cyst measuring 0.6 x 0.6 x 0.3 cm, without significant change compared to 1/5/2024. Continued f/u with targeted US in 6 mos. Impression: Probably benign outer right breast asymmetry appears less conspicuous compared to prior. Continued f/u with dMMG in 6mos at the time of patient's annual mmg to ascertain 1 year stability. Stable probably benign right breast 10:00, 6Nhypoechoic mass likely complicated cyst. Continued f/u with targeted US in 6mos at the time of patient's annual mmg to ascertain 1 year stability. Please note at the same time patient will be due for f/u of US seen probably benign left breast finding as per report dated 1/5/2024. REC: dMMG and US in 6 months. BR3  Fhx: None for cancer. Not AJ  CBE: 32B, Bilat FCC, DANA. No suspicious masses or lesions., No axillary or SC lymphadenopathy. Reviewed recent mmg and u/s from 8/24, no suspicious findings, R complex cyst and prior L stable prob benign US 12:00 mass for which f.u is rec. BR3.  Again recommend Ibuprofen as needed for mastalgia, decrease coffee consumption also recommended, pt is perimenopausal.  Patient lives in [place], made her aware we are happy to continue to see her but given her long commute to our office, contact for Dr. Silvia Reyes also provided.  Pt forgot about it from our last discussion.   PLAN:  F/u after bilat mmg and u/s 2/25 with NP. Rec NSAIDs and decrease caffeine intake for cyclical mastalgia. PT may consider transfer of care closer to home, contact of Dr. Sylvia Reyes given.

## 2024-08-26 NOTE — ASSESSMENT
[FreeTextEntry1] : Referred by Richie Thomas DO PCP Dr. Keith Leventhal  Pt is a 49 year old Occitan speaking female here today for f/u after recent studies.  Here with her , Mauricio, requests that he translate.  Pt denies any breast lesions, discharge or masses. Has occasional right mastalgia, she is perimenopausal.   9/12/22 [facility], Bilat sMMG: Het dense. L- 6:00 there is a small group of microcalcs which appear to be linearly oriented. L dMMG recommended. Additional scattered benign calc are noted. R- superior, in the lateral aspect 3cm behind the nipple there is a question of architectural distortion. R dMMG and targeted US rec. BR0  9/27/22 [facility], bilat dMMG and US: Het dense. R- previously questioned possible area of architectural distortion in the upper R breast effaces on spot compression. **L- cluster of coarse het calcs is seen in the lower central L breast 6:00 4cmfn, these cals are indeterminant. Further eval with stereotactic bx is rec. US: R- 10:00 2cmfn is a deeply located 0.9 x 0.4 x 1.0cm cyst. No susp finding seen in R breast or axilla. L- 12:00 1cmfn 0.8 x 0.5 x 0.8cm circumscribed solid oval hypoechoic mass. *This is probably benign finding and 6 mos f/u US is rec. 12:00 1cmfn 0.3 x 0.3 x 0.3 cm cyst. 8:00 6cmfn 0.5cm cyst. 9:00 9cmfn 0.6cm cyst. Impression: L stereotactic bx is rec 6:00. L 6 mos f/u US for 12:00 0.8cm mass. Right no evidence of malignancy. BR4  10/20/22 [facility], L stereotactic bx. Path: Proliferative and nonproliferative breast changes without atypia characterized by usual type ductal hyperplasia, sclerosing adenosis, columnar cell change, cysts, apocrine metaplasia, pseudoangiomatous stromal hyperplasia and dense stromal fibrosis. Calcs are associated with benign breast tissue. Results are Benign and Concordant.  3/28/23 [place], L dMMG and US: Extremely dense. Bx clip within residual calcs in the region of previous benign stereotactic bx in the L lower central breast. No susp mass, microcals or other sign of malignancy is identified. US: Circumscribed hypoechoic mass 12:00 1cmfn measuring 0.8x0.5x0.8cm, stable since 9/2022. This finding is probably benign, continues short interval f/u is recommended. Impression: MMG negative. Stable probably benign L breast mass. Targeted L US at time of annual mmg is rec. BR3  1/5/24 [place] dMMG and US: TC 17.8%. Extremely dense. There is an asymmetry in the right outer breast, posterior depth, best seen on the full CC and spot compression CC view. No suspicious mass, architectural distortion or microcalcifications are identified in the left breast. US: R- No susp solid mass. There is a complicated cyst in the 10:00 axis, 6cmfn, measuring 0.6 x 0.6 x 0.3 cm. This finding may correspond with mammographic finding described above and is probably benign, short interval f/u is rec. L- No suspicious solid mass. There is a circumscribed hypoechoic mass in the 12:00 axis, 1cmfn measuring 0.8 x 0.7 x 0.5 cm, stable since March 2022. There are benign-appearing cysts in the left breast, measuring up to 0.5 cm. Impression: Probably benign right breast asymmetry, likely corresponding with complicated cyst. Right dMMG and targeted right breast US in 6 mos is rec to demonstrate stability. Stable probably benign left breast mass. Targeted left breast US in 1yr is rec to demonstrate further two-year stability. BR3.  8/8/24 [facility]isel[facility] Reece dMMG and R limited US: Extremely dense. Previously noted probably benign posterior outer right breast asymmetry is less conspicuous on today's imaging. Continued f/u is advised. Initially noted addl asymmetry in the posterior Central right breast seen on CC slice 8/23 favors overlapping FG tissue on supplemental images. No new susp mass, microcals or other sign of malignancy is identified.   R limited US: 10:00, 6N stable probably benign circumscribed hypoechoic mass likely complicated cyst measuring 0.6 x 0.6 x 0.3 cm, without significant change compared to 1/5/2024. Continued f/u with targeted US in 6 mos. Impression: Probably benign outer right breast asymmetry appears less conspicuous compared to prior. Continued f/u with dMMG in 6mos at the time of patient's annual mmg to ascertain 1 year stability. Stable probably benign right breast 10:00, 6Nhypoechoic mass likely complicated cyst. Continued f/u with targeted US in 6mos at the time of patient's annual mmg to ascertain 1 year stability. Please note at the same time patient will be due for f/u of US seen probably benign left breast finding as per report dated 1/5/2024. REC: dMMG and US in 6 months. BR3  Fhx: None for cancer. Not AJ  CBE: 32B, Bilat FCC, DANA. No suspicious masses or lesions., No axillary or SC lymphadenopathy. Reviewed recent mmg and u/s from 8/24, no suspicious findings, R complex cyst and prior L stable prob benign US 12:00 mass for which f.u is rec. BR3.  Again recommend Ibuprofen as needed for mastalgia, decrease coffee consumption also recommended, pt is perimenopausal.  Patient lives in [place], made her aware we are happy to continue to see her but given her long commute to our office, contact for Dr. Silvia Reyes also provided.  Pt forgot about it from our last discussion.   PLAN:  F/u after bilat mmg and u/s 2/25 with NP. Rec NSAIDs and decrease caffeine intake for cyclical mastalgia. PT may consider transfer of care closer to home, contact of Dr. Sylvia Reyes given.

## 2024-08-26 NOTE — DATA REVIEWED
[FreeTextEntry1] : 1/5/24 [place], bilat dMMG and US: TC 17.8%. Extremely dense. There is an asymmetry in the right outer breast, posterior depth, best seen on the full CC and spot compression CC view. No suspicious mass, architectural distortion or microcalcifications are identified in the left breast. US: R- No susp solid mass. There is a complicated cyst in the 10:00 axis, 6cmfn, measuring 0.6 x 0.6 x 0.3 cm. This finding may correspond with mammographic finding described above and is probably benign, short interval f/u is rec. L- No suspicious solid mass. There is a circumscribed hypoechoic mass in the 12:00 axis, 1cmfn measuring 0.8 x 0.7 x 0.5 cm, stable since March 2022. There are benign-appearing cysts in the left breast, measuring up to 0.5 cm. Impression: Probably benign right breast asymmetry, likely corresponding with complicated cyst. Right dMMG and targeted right breast US in 6 mos is rec to demonstrate stability. Stable probably benign left breast mass. Targeted left breast US in 1yr is rec to demonstrate further two-year stability. BR3.  8/8/24 [place], bilat dMMG and R limited US: Extremely dense. Previously noted probably benign posterior outer right breast asymmetry is less conspicuous on today's imaging. Continued f/u is advised. Initially noted addl asymmetry in the posterior Central right breast seen on CC slice 8/23 favors overlapping FG tissue on supplemental images. No new susp mass, microcals or other sign of malignancy is identified.   R limited US: 10:00, 6N stable probably benign circumscribed hypoechoic mass likely complicated cyst measuring 0.6 x 0.6 x 0.3 cm, without significant change compared to 1/5/2024. Continued f/u with targeted US in 6 mos. Impression: Probably benign outer right breast asymmetry appears less conspicuous compared to prior. Continued f/u with dMMG in 6mos at the time of patient's annual mmg to ascertain 1 year stability. Stable probably benign right breast 10:00, 6Nhypoechoic mass likely complicated cyst. Continued f/u with targeted US in 6mos at the time of patient's annual mmg to ascertain 1 year stability. Please note at the same time patient will be due for f/u of US seen probably benign left breast finding as per report dated 1/5/2024. REC: dMMG and US in 6 months. BR3

## 2024-09-16 ENCOUNTER — APPOINTMENT (OUTPATIENT)
Dept: ORTHOPEDIC SURGERY | Facility: CLINIC | Age: 49
End: 2024-09-16
Payer: COMMERCIAL

## 2024-09-16 ENCOUNTER — NON-APPOINTMENT (OUTPATIENT)
Age: 49
End: 2024-09-16

## 2024-09-16 DIAGNOSIS — G56.02 CARPAL TUNNEL SYNDROME, LEFT UPPER LIMB: ICD-10-CM

## 2024-09-16 DIAGNOSIS — G56.01 CARPAL TUNNEL SYNDROME, RIGHT UPPER LIMB: ICD-10-CM

## 2024-09-16 PROCEDURE — 99212 OFFICE O/P EST SF 10 MIN: CPT

## 2024-09-16 NOTE — ASSESSMENT
[FreeTextEntry1] : - activity as tolerated. - Work: she is currently OOW. ok to return to work without restrictions on 9/17/24.   Plan to address LEFT side once she is able to take time off work. Patient will call to schedule f/u appointment when she is ready to proceed.

## 2024-09-16 NOTE — HISTORY OF PRESENT ILLNESS
[de-identified] : 9/16/24: HPI obtained with  ([place]). - 4 months s/p RIGHT CTR 5/17/24. doing well, reports minimal pain. denies numbness. - f/u LEFT CTS. continues to have intermittent numbness. She has not returned to work because light duty was not available.  8/22/24: HPI obtained with  ([place]). - 3 months s/p RIGHT CTR 5/17/24. c/o thenar and ulnar hand pain with heavy lifting. numbness has resolved. Reports that OT stopped ~2 weeks ago, needs a new Rx.  7/2/24: presents with  - assisting with HPI and translation ([place]) - 6.5 weeks s/p RIGHT CTR 5/17/24. c/o hypothenar pain with gripping. numbness is improving, still over 2nd/3rd MPJ. OT 2x/wk.  6/12/24: presents with daughter - assisting with HPI and translation ([place]) - 3.5 weeks s/p RIGHT CTR 5/17/24. c/o pain over thenar and hypothenar muscles with gripping/lifting. reports numbness is improving, now localized to 2nd/3rd MPJ. OT 2x/wk @[facility].  5/22/24: HPI obtained with  ([place]izen [place]) 5 days s/p RIGHT CTR 5/17/24. c/o pain and numbness over dorsal MPJ when making a fist. denies numbness/tingling into fingertips. also c/o pain and numbness of upper arm. denies f/c or ill sx.  5/1/24: presents with daughter - assisting with HPI and translation ([place]) - f/u EDX for bilateral CTS. - R ulnar abutment.  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 - R median: DML 4.2 ms, DSL 4.1 ms. - L ulnar: 67 m/s above to below elbow, 69 m/s below elbow to wrist. - R ulnar: 67 m/s above to below elbow, 71 m/s below elbow to wrist.  3/7/24: 48yo RHD female () presents for RIGHT wrist pain and BILATERAL middle/ring/small finger numbness. Reports swelling of RIGHT dorsal hand 2 months ago. Numbness is intermittent. Reports that she tried wearing her daughter's wrist brace, but it made the numbness worse.  PMH: GERD. HTN. HLD. Uterine fibroma. PSH: lumbar discectomy. NKDA. [FreeTextEntry5] : CHRISTIANA is here today to follow up on her RIGHT hand. denies symptoms. reports numbness in LEFT hand is the same.

## 2024-09-16 NOTE — IMAGING
[de-identified] : LEFT HAND skin intact. no swelling. no TTP. good elbow extension, flexion. good pronation, supination. wrist ROM: good extension, flexion. good EPL, FPL. good finger extension, flex to full fist. good finger abduction and adduction. SILT to median, ulnar, radial distributions. palpable radial pulse, brisk cap refill all digits. APB 5/5. no triggering. + Tinel's at carpal tunnel.   RIGHT HAND well healed scar s/p CTR.  wrist ROM: good extension, flexion. good EPL, FPL. good finger extension, flex to full fist. good finger abduction and adduction.  SILT to median, ulnar, radial distributions. palpable radial pulse, brisk cap refill all digits. no triggering.

## 2025-02-17 ENCOUNTER — DOCTOR'S OFFICE (OUTPATIENT)
Facility: LOCATION | Age: 50
Setting detail: OPHTHALMOLOGY
End: 2025-02-17
Payer: COMMERCIAL

## 2025-02-17 DIAGNOSIS — H11.153: ICD-10-CM

## 2025-02-17 DIAGNOSIS — H25.13: ICD-10-CM

## 2025-02-17 DIAGNOSIS — H01.004: ICD-10-CM

## 2025-02-17 DIAGNOSIS — H01.001: ICD-10-CM

## 2025-02-17 PROBLEM — H52.7 REFRACTIVE ERROR: Status: ACTIVE | Noted: 2025-02-17

## 2025-02-17 PROCEDURE — 92014 COMPRE OPH EXAM EST PT 1/>: CPT | Performed by: OPHTHALMOLOGY

## 2025-02-17 ASSESSMENT — REFRACTION_AUTOREFRACTION
OD_AXIS: 086
OD_CYLINDER: +0.25
OD_SPHERE: +1.00
OS_AXIS: 066
OS_SPHERE: +1.25
OS_CYLINDER: +0.25

## 2025-02-17 ASSESSMENT — LID EXAM ASSESSMENTS
OD_BLEPHARITIS: RUL T
OS_MEIBOMITIS: LLL 1+
OD_MEIBOMITIS: RLL 1+
OS_BLEPHARITIS: LUL T
OS_COMMENTS: MISSING GLANDS

## 2025-02-17 ASSESSMENT — REFRACTION_MANIFEST
OD_VA1: 20/25-1
OS_CYLINDER: +0.50
OS_VA1: 20/25-2
OS_SPHERE: +1.25
OD_ADD: +2.25
OS_AXIS: 080
OS_VA1: 20/20
OS_AXIS: 075
OD_CYLINDER: +0.75
OD_VA1: 20/20
OD_CYLINDER: +0.25
OS_CYLINDER: +0.25
OD_AXIS: 085
OD_SPHERE: +1.00
OS_SPHERE: +1.00
OD_AXIS: 085
OS_ADD: +2.25
OD_SPHERE: +0.50

## 2025-02-17 ASSESSMENT — REFRACTION_CURRENTRX
OS_AXIS: 075
OD_CYLINDER: +0.25
OS_OVR_VA: 20/
OD_AXIS: 090
OD_VPRISM_DIRECTION: SV
OS_CYLINDER: +0.25
OD_OVR_VA: 20/
OS_SPHERE: +2.75
OD_SPHERE: +2.75
OS_VPRISM_DIRECTION: SV

## 2025-02-17 ASSESSMENT — TONOMETRY
OS_IOP_MMHG: 17
OD_IOP_MMHG: 17

## 2025-02-17 ASSESSMENT — KERATOMETRY
OS_K2POWER_DIOPTERS: 47.25
OS_AXISANGLE_DEGREES: 082
OD_AXISANGLE_DEGREES: 085
OD_K2POWER_DIOPTERS: 47.00
OS_K1POWER_DIOPTERS: 46.00
OD_K1POWER_DIOPTERS: 45.75

## 2025-02-17 ASSESSMENT — CONFRONTATIONAL VISUAL FIELD TEST (CVF)
OD_FINDINGS: FULL
OS_FINDINGS: FULL

## 2025-02-17 ASSESSMENT — VISUAL ACUITY
OS_BCVA: 20/30-2
OD_BCVA: 20/30

## 2025-03-14 ENCOUNTER — APPOINTMENT (OUTPATIENT)
Dept: ULTRASOUND IMAGING | Facility: CLINIC | Age: 50
End: 2025-03-14
Payer: COMMERCIAL

## 2025-03-14 ENCOUNTER — RESULT REVIEW (OUTPATIENT)
Age: 50
End: 2025-03-14

## 2025-03-14 ENCOUNTER — APPOINTMENT (OUTPATIENT)
Dept: MAMMOGRAPHY | Facility: CLINIC | Age: 50
End: 2025-03-14
Payer: COMMERCIAL

## 2025-03-14 PROCEDURE — 77062 BREAST TOMOSYNTHESIS BI: CPT

## 2025-03-14 PROCEDURE — 76641 ULTRASOUND BREAST COMPLETE: CPT | Mod: 50

## 2025-03-14 PROCEDURE — 77066 DX MAMMO INCL CAD BI: CPT

## 2025-03-17 ENCOUNTER — NON-APPOINTMENT (OUTPATIENT)
Age: 50
End: 2025-03-17

## 2025-03-17 ENCOUNTER — APPOINTMENT (OUTPATIENT)
Facility: CLINIC | Age: 50
End: 2025-03-17
Payer: COMMERCIAL

## 2025-03-17 VITALS
WEIGHT: 124 LBS | DIASTOLIC BLOOD PRESSURE: 74 MMHG | BODY MASS INDEX: 26.75 KG/M2 | SYSTOLIC BLOOD PRESSURE: 136 MMHG | HEIGHT: 57 IN

## 2025-03-17 DIAGNOSIS — R92.8 OTHER ABNORMAL AND INCONCLUSIVE FINDINGS ON DIAGNOSTIC IMAGING OF BREAST: ICD-10-CM

## 2025-03-17 DIAGNOSIS — N60.11 DIFFUSE CYSTIC MASTOPATHY OF LEFT BREAST: ICD-10-CM

## 2025-03-17 DIAGNOSIS — N60.12 DIFFUSE CYSTIC MASTOPATHY OF LEFT BREAST: ICD-10-CM

## 2025-03-17 PROCEDURE — 99203 OFFICE O/P NEW LOW 30 MIN: CPT

## 2025-04-14 ENCOUNTER — APPOINTMENT (OUTPATIENT)
Dept: ORTHOPEDIC SURGERY | Facility: CLINIC | Age: 50
End: 2025-04-14

## 2025-04-14 DIAGNOSIS — M65.311 TRIGGER THUMB, RIGHT THUMB: ICD-10-CM

## 2025-04-14 DIAGNOSIS — G56.02 CARPAL TUNNEL SYNDROME, LEFT UPPER LIMB: ICD-10-CM

## 2025-04-14 PROCEDURE — 73140 X-RAY EXAM OF FINGER(S): CPT | Mod: RT

## 2025-04-14 PROCEDURE — 20550 NJX 1 TENDON SHEATH/LIGAMENT: CPT | Mod: RT

## 2025-04-14 PROCEDURE — 99214 OFFICE O/P EST MOD 30 MIN: CPT | Mod: 25

## (undated) DEVICE — PACK ORTHO

## (undated) DEVICE — BLADE SURGICAL #15 CARBON

## (undated) DEVICE — PREP CHLORAPREP HI-LITE ORANGE 26ML

## (undated) DEVICE — TOURNIQUET ESMARK 4"

## (undated) DEVICE — DRSG ACE BANDAGE 3"

## (undated) DEVICE — DRAPE TOWEL BLUE 17" X 24"

## (undated) DEVICE — IMMOBILIZER HAND ALUMINUM XL

## (undated) DEVICE — GLV 6 PROTEXIS (BLUE)

## (undated) DEVICE — DRSG WEBRIL 3"

## (undated) DEVICE — FRA-TOURNIQUET 402010060005: Type: DURABLE MEDICAL EQUIPMENT

## (undated) DEVICE — ELCTR BIPOLAR CORD 12FT

## (undated) DEVICE — DRSG CAST PLASTER XFAST 3"

## (undated) DEVICE — DRSG COBAN 4"

## (undated) DEVICE — DRAPE 3/4 SHEET W REINFORCEMENT 56X77"

## (undated) DEVICE — SYR LUER LOK 20CC

## (undated) DEVICE — GLV 6.5 PROTEXIS (WHITE)

## (undated) DEVICE — DRAPE EXTREMITY 87" X 128.5"

## (undated) DEVICE — DRSG XEROFORM 5 X 9"